# Patient Record
Sex: MALE | Race: OTHER | HISPANIC OR LATINO | URBAN - METROPOLITAN AREA
[De-identification: names, ages, dates, MRNs, and addresses within clinical notes are randomized per-mention and may not be internally consistent; named-entity substitution may affect disease eponyms.]

---

## 2017-05-19 ENCOUNTER — EMERGENCY (EMERGENCY)
Facility: HOSPITAL | Age: 32
LOS: 1 days | Discharge: HOME | End: 2017-05-19
Attending: EMERGENCY MEDICINE
Payer: COMMERCIAL

## 2017-05-19 VITALS
SYSTOLIC BLOOD PRESSURE: 115 MMHG | DIASTOLIC BLOOD PRESSURE: 71 MMHG | HEART RATE: 76 BPM | TEMPERATURE: 98 F | OXYGEN SATURATION: 97 % | HEIGHT: 69 IN | RESPIRATION RATE: 18 BRPM | WEIGHT: 190.04 LBS

## 2017-05-19 DIAGNOSIS — M54.2 CERVICALGIA: ICD-10-CM

## 2017-05-19 DIAGNOSIS — S80.212A ABRASION, LEFT KNEE, INITIAL ENCOUNTER: ICD-10-CM

## 2017-05-19 DIAGNOSIS — M54.5 LOW BACK PAIN: ICD-10-CM

## 2017-05-19 DIAGNOSIS — S80.01XA CONTUSION OF RIGHT KNEE, INITIAL ENCOUNTER: ICD-10-CM
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
## 2017-05-19 DIAGNOSIS — R51 HEADACHE: ICD-10-CM

## 2017-05-19 DIAGNOSIS — G44.319 ACUTE POST-TRAUMATIC HEADACHE, NOT INTRACTABLE: ICD-10-CM

## 2017-05-19 DIAGNOSIS — M79.652 PAIN IN LEFT THIGH: ICD-10-CM
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
## 2017-05-19 DIAGNOSIS — S46.911A STRAIN OF UNSPECIFIED MUSCLE, FASCIA AND TENDON AT SHOULDER AND UPPER ARM LEVEL, RIGHT ARM, INITIAL ENCOUNTER: ICD-10-CM

## 2017-05-19 DIAGNOSIS — M79.601 PAIN IN RIGHT ARM: ICD-10-CM

## 2017-05-19 DIAGNOSIS — M54.6 PAIN IN THORACIC SPINE: ICD-10-CM

## 2017-05-19 PROCEDURE — 73030 X-RAY EXAM OF SHOULDER: CPT

## 2017-05-19 PROCEDURE — 99284 EMERGENCY DEPT VISIT MOD MDM: CPT | Mod: 25

## 2017-05-19 PROCEDURE — 73130 X-RAY EXAM OF HAND: CPT | Mod: 26,RT

## 2017-05-19 PROCEDURE — 99284 EMERGENCY DEPT VISIT MOD MDM: CPT

## 2017-05-19 PROCEDURE — 73030 X-RAY EXAM OF SHOULDER: CPT | Mod: 26,RT

## 2017-05-19 PROCEDURE — 73130 X-RAY EXAM OF HAND: CPT

## 2017-05-19 RX ORDER — IBUPROFEN 200 MG
600 TABLET ORAL ONCE
Qty: 0 | Refills: 0 | Status: COMPLETED | OUTPATIENT
Start: 2017-05-19 | End: 2017-05-19

## 2017-05-19 RX ORDER — IBUPROFEN 200 MG
1 TABLET ORAL
Qty: 20 | Refills: 0
Start: 2017-05-19 | End: 2017-05-24

## 2017-05-19 RX ORDER — METHOCARBAMOL 500 MG/1
1000 TABLET, FILM COATED ORAL ONCE
Qty: 0 | Refills: 0 | Status: COMPLETED | OUTPATIENT
Start: 2017-05-19 | End: 2017-05-19

## 2017-05-19 RX ORDER — METHOCARBAMOL 500 MG/1
2 TABLET, FILM COATED ORAL
Qty: 18 | Refills: 0
Start: 2017-05-19 | End: 2017-05-23

## 2017-05-19 RX ADMIN — METHOCARBAMOL 1000 MILLIGRAM(S): 500 TABLET, FILM COATED ORAL at 14:09

## 2017-05-19 RX ADMIN — Medication 600 MILLIGRAM(S): at 14:09

## 2017-05-19 NOTE — ED STATDOCS - MUSCULOSKELETAL FINDINGS, MLM
Diffused midline, C-spine tenderness, left trapezius tenderness. Left para thorasic muscle tenderness and paralumbar tenderness. LLE no localized bony tenderness and FROM. Tenderness over 1st right menicarpal pharngeal joint. No scaphoid tenderness. Tendernesss to right schapula, FROM in elbows and shoulders. Diffuse midline C-spine tenderness, left trapezius tenderness. Left para thorasic muscle tenderness and paralumbar tenderness. LLE no localized bony tenderness and FROM. Tenderness over 1st right menicarpal pharngeal joint. No scaphoid tenderness. Tendernesss to right scapula, FROM in elbows and shoulders.
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
## 2017-05-19 NOTE — ED STATDOCS - PROGRESS NOTE DETAILS
NP NOTE: Pt seen by intake physician and HPI/ROS/PE/MDM reviewed. Pt seen and evaluated. Discussed plan and any resulted studies at this time. Will continue to monitor and re-evaluate.  Re-Evaluation: pt feeling improved. will dc.
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
## 2017-05-19 NOTE — ED STATDOCS - NS ED MD SCRIBE ATTENDING SCRIBE SECTIONS
HISTORY OF PRESENT ILLNESS/PAST MEDICAL/SURGICAL/SOCIAL HISTORY/HIV/PROGRESS NOTE/VITAL SIGNS( Pullset)/PHYSICAL EXAM/CONSULTATIONS/SHIFT CHANGE/DISPOSITION/INTAKE ASSESSMENT/SCREENINGS/REVIEW OF SYSTEMS/RESULTS

## 2017-05-19 NOTE — ED STATDOCS - LEFT SKIN INTEGRITY
ecchymosis medial aspect of right knee. ecchymosis medial aspect of right knee.  abrasion over anterior aspect of left knee

## 2017-05-19 NOTE — ED STATDOCS - ATTENDING CONTRIBUTION TO CARE
I, Riccardo Rogers, performed the initial face to face bedside interview with this patient regarding history of present illness, review of symptoms and relevant past medical, social and family history.  I completed an independent physical examination.  I was the provider who initially evaluated this patient.  The history, relevant review of systems, past medical and surgical history, medical decision making, and physical examination was documented by the scribe in my presence and I attest to the accuracy of the documentation. Follow-up on ordered tests (ie labs, radiologic studies) and re-evaluation of the patient's status has been communicated to the ACP.  Disposition of the patient will be based on test outcome and response to ED interventions.

## 2018-02-09 ENCOUNTER — EMERGENCY (EMERGENCY)
Facility: HOSPITAL | Age: 33
LOS: 1 days | Discharge: HOME | End: 2018-02-09
Attending: STUDENT IN AN ORGANIZED HEALTH CARE EDUCATION/TRAINING PROGRAM
Payer: COMMERCIAL

## 2018-02-09 VITALS
HEART RATE: 103 BPM | DIASTOLIC BLOOD PRESSURE: 86 MMHG | TEMPERATURE: 99 F | WEIGHT: 190.04 LBS | HEIGHT: 71 IN | SYSTOLIC BLOOD PRESSURE: 142 MMHG | OXYGEN SATURATION: 100 % | RESPIRATION RATE: 18 BRPM

## 2018-02-09 PROCEDURE — 90715 TDAP VACCINE 7 YRS/> IM: CPT

## 2018-02-09 PROCEDURE — 99283 EMERGENCY DEPT VISIT LOW MDM: CPT | Mod: 25

## 2018-02-09 PROCEDURE — 12001 RPR S/N/AX/GEN/TRNK 2.5CM/<: CPT

## 2018-02-09 PROCEDURE — 90471 IMMUNIZATION ADMIN: CPT

## 2018-02-09 RX ORDER — TETANUS TOXOID, REDUCED DIPHTHERIA TOXOID AND ACELLULAR PERTUSSIS VACCINE, ADSORBED 5; 2.5; 8; 8; 2.5 [IU]/.5ML; [IU]/.5ML; UG/.5ML; UG/.5ML; UG/.5ML
0.5 SUSPENSION INTRAMUSCULAR ONCE
Qty: 0 | Refills: 0 | Status: COMPLETED | OUTPATIENT
Start: 2018-02-09 | End: 2018-02-09

## 2018-02-09 RX ORDER — CEPHALEXIN 500 MG
1 CAPSULE ORAL
Qty: 10 | Refills: 0
Start: 2018-02-09 | End: 2018-02-13

## 2018-02-09 RX ADMIN — TETANUS TOXOID, REDUCED DIPHTHERIA TOXOID AND ACELLULAR PERTUSSIS VACCINE, ADSORBED 0.5 MILLILITER(S): 5; 2.5; 8; 8; 2.5 SUSPENSION INTRAMUSCULAR at 19:36

## 2018-02-09 NOTE — ED PROVIDER NOTE - ATTENDING CONTRIBUTION TO CARE
31 yo male with hand laceration. I personally saw the patient with the PA, and completed the key components of the history and physical exam. I then discussed the management plan with the PA.

## 2018-02-09 NOTE — ED PROVIDER NOTE - OBJECTIVE STATEMENT
31 yo M presented to ED with c/o laceration to right hand sustained while at work. Pt states that he was working as a  and sustained laceration on sharp piece of metal. No weakness or paresthesia. dT unknown. Bleeding controlled.

## 2018-02-09 NOTE — ED PROVIDER NOTE - SKIN WOUND TYPE
LACERATION(S)/+ 2.5 cm superficial laceration to lateral aspect mid 5th metacarpal area. Superficial lac. No muscle, tendon, ligament, or joinmt invol. FROM. Sensation intact

## 2019-04-18 ENCOUNTER — EMERGENCY (EMERGENCY)
Facility: HOSPITAL | Age: 34
LOS: 1 days | End: 2019-04-18
Attending: EMERGENCY MEDICINE
Payer: SELF-PAY

## 2019-04-18 VITALS
OXYGEN SATURATION: 98 % | DIASTOLIC BLOOD PRESSURE: 88 MMHG | WEIGHT: 205.03 LBS | SYSTOLIC BLOOD PRESSURE: 135 MMHG | HEART RATE: 89 BPM | HEIGHT: 69 IN | RESPIRATION RATE: 18 BRPM | TEMPERATURE: 98 F

## 2019-04-18 PROCEDURE — 73030 X-RAY EXAM OF SHOULDER: CPT

## 2019-04-18 PROCEDURE — 73030 X-RAY EXAM OF SHOULDER: CPT | Mod: 26,RT

## 2019-04-18 PROCEDURE — 71046 X-RAY EXAM CHEST 2 VIEWS: CPT

## 2019-04-18 PROCEDURE — 99283 EMERGENCY DEPT VISIT LOW MDM: CPT

## 2019-04-18 PROCEDURE — 71046 X-RAY EXAM CHEST 2 VIEWS: CPT | Mod: 26

## 2019-04-18 PROCEDURE — 99284 EMERGENCY DEPT VISIT MOD MDM: CPT | Mod: 25

## 2019-04-18 RX ORDER — METHOCARBAMOL 500 MG/1
2 TABLET, FILM COATED ORAL
Qty: 12 | Refills: 0
Start: 2019-04-18 | End: 2019-04-19

## 2019-04-18 RX ORDER — METHOCARBAMOL 500 MG/1
750 TABLET, FILM COATED ORAL ONCE
Qty: 0 | Refills: 0 | Status: COMPLETED | OUTPATIENT
Start: 2019-04-18 | End: 2019-04-18

## 2019-04-18 RX ORDER — IBUPROFEN 200 MG
600 TABLET ORAL ONCE
Qty: 0 | Refills: 0 | Status: COMPLETED | OUTPATIENT
Start: 2019-04-18 | End: 2019-04-18

## 2019-04-18 RX ORDER — IBUPROFEN 200 MG
1 TABLET ORAL
Qty: 9 | Refills: 0
Start: 2019-04-18 | End: 2019-04-20

## 2019-04-18 RX ADMIN — Medication 600 MILLIGRAM(S): at 21:37

## 2019-04-18 RX ADMIN — METHOCARBAMOL 750 MILLIGRAM(S): 500 TABLET, FILM COATED ORAL at 21:53

## 2019-04-18 NOTE — ED PROVIDER NOTE - CLINICAL SUMMARY MEDICAL DECISION MAKING FREE TEXT BOX
34 y/o M with pain in right shoulder s/p injury today, ttp of trapezius muscle, no scapula ttp  -Will check XR of shoulder and chest, motrin/robaxin  -Will follow up and re-evaluate patient

## 2019-04-18 NOTE — ED ADULT TRIAGE NOTE - CHIEF COMPLAINT QUOTE
working on car on a lift hit me in my right shoulder pain no gross deformity limited rom good distal pulses
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
## 2019-04-18 NOTE — ED PROVIDER NOTE - CHPI ED SYMPTOMS NEG
no tingling/no abrasion/no back pain/no difficulty bearing weight/no bruising/no fever/no numbness/no weakness/no deformity/no stiffness

## 2019-04-18 NOTE — ED PROVIDER NOTE - PROGRESS NOTE DETAILS
PASCUAL Mustafa NOTE: Reviewed XRs with Dr. Capellan, no acute fx or dislocation PASCUAL Mustafa NOTE: Pt has ride home, instructed not to drive while on muscle relaxers. Patient stable for discharge, reports improvement in pain s/p medications, vital signs stable, tolerating PO, ambulatory in ED.  Discussion with pt includes but is not limited to: results, plan, proper medication use and side effects, and return precautions. Patient will follow up with their PMD in 1-2 days and any specialists discussed. Advised patient to seek immediate medical attention for any new/worsening symptoms or concerns.  Patient provided with ample opportunity to ask questions which were answered to the fullest extent. Patient verbalized understanding and agreement of plan.

## 2019-04-18 NOTE — ED PROVIDER NOTE - ATTENDING CONTRIBUTION TO CARE
Pt. awake and alert. NO acute distress. +Tenderness to right upper back/trapezius region.. No cervical tenderness. No weakness noted to his upper or lower extremities. I have discussed the plan with the ACP.

## 2019-12-29 ENCOUNTER — EMERGENCY (EMERGENCY)
Facility: HOSPITAL | Age: 34
LOS: 1 days | Discharge: HOME | End: 2019-12-29
Attending: EMERGENCY MEDICINE
Payer: SELF-PAY

## 2019-12-29 VITALS
RESPIRATION RATE: 20 BRPM | OXYGEN SATURATION: 97 % | SYSTOLIC BLOOD PRESSURE: 146 MMHG | HEIGHT: 69 IN | WEIGHT: 205.03 LBS | TEMPERATURE: 97 F | HEART RATE: 75 BPM | DIASTOLIC BLOOD PRESSURE: 96 MMHG

## 2019-12-29 PROBLEM — J45.909 UNSPECIFIED ASTHMA, UNCOMPLICATED: Chronic | Status: ACTIVE | Noted: 2019-04-18

## 2019-12-29 LAB
ALBUMIN SERPL ELPH-MCNC: 4.5 G/DL — SIGNIFICANT CHANGE UP (ref 3.3–5.2)
ALP SERPL-CCNC: 103 U/L — SIGNIFICANT CHANGE UP (ref 40–120)
ALT FLD-CCNC: 70 U/L — HIGH
ANION GAP SERPL CALC-SCNC: 14 MMOL/L — SIGNIFICANT CHANGE UP (ref 5–17)
APTT BLD: 31.6 SEC — SIGNIFICANT CHANGE UP (ref 27.5–36.3)
AST SERPL-CCNC: 67 U/L — HIGH
BASOPHILS # BLD AUTO: 0.05 K/UL — SIGNIFICANT CHANGE UP (ref 0–0.2)
BASOPHILS NFR BLD AUTO: 0.9 % — SIGNIFICANT CHANGE UP (ref 0–2)
BILIRUB SERPL-MCNC: 0.2 MG/DL — LOW (ref 0.4–2)
BUN SERPL-MCNC: 16 MG/DL — SIGNIFICANT CHANGE UP (ref 8–20)
CALCIUM SERPL-MCNC: 9 MG/DL — SIGNIFICANT CHANGE UP (ref 8.6–10.2)
CHLORIDE SERPL-SCNC: 101 MMOL/L — SIGNIFICANT CHANGE UP (ref 98–107)
CO2 SERPL-SCNC: 21 MMOL/L — LOW (ref 22–29)
CREAT SERPL-MCNC: 0.95 MG/DL — SIGNIFICANT CHANGE UP (ref 0.5–1.3)
EOSINOPHIL # BLD AUTO: 0.47 K/UL — SIGNIFICANT CHANGE UP (ref 0–0.5)
EOSINOPHIL NFR BLD AUTO: 8 % — HIGH (ref 0–6)
GLUCOSE SERPL-MCNC: 98 MG/DL — SIGNIFICANT CHANGE UP (ref 70–115)
HCT VFR BLD CALC: 40.8 % — SIGNIFICANT CHANGE UP (ref 39–50)
HGB BLD-MCNC: 13.8 G/DL — SIGNIFICANT CHANGE UP (ref 13–17)
IMM GRANULOCYTES NFR BLD AUTO: 0.3 % — SIGNIFICANT CHANGE UP (ref 0–1.5)
INR BLD: 0.83 RATIO — LOW (ref 0.88–1.16)
LYMPHOCYTES # BLD AUTO: 1.67 K/UL — SIGNIFICANT CHANGE UP (ref 1–3.3)
LYMPHOCYTES # BLD AUTO: 28.5 % — SIGNIFICANT CHANGE UP (ref 13–44)
MCHC RBC-ENTMCNC: 31.4 PG — SIGNIFICANT CHANGE UP (ref 27–34)
MCHC RBC-ENTMCNC: 33.8 GM/DL — SIGNIFICANT CHANGE UP (ref 32–36)
MCV RBC AUTO: 92.9 FL — SIGNIFICANT CHANGE UP (ref 80–100)
MONOCYTES # BLD AUTO: 0.54 K/UL — SIGNIFICANT CHANGE UP (ref 0–0.9)
MONOCYTES NFR BLD AUTO: 9.2 % — SIGNIFICANT CHANGE UP (ref 2–14)
NEUTROPHILS # BLD AUTO: 3.1 K/UL — SIGNIFICANT CHANGE UP (ref 1.8–7.4)
NEUTROPHILS NFR BLD AUTO: 53.1 % — SIGNIFICANT CHANGE UP (ref 43–77)
PLATELET # BLD AUTO: 230 K/UL — SIGNIFICANT CHANGE UP (ref 150–400)
POTASSIUM SERPL-MCNC: 4.3 MMOL/L — SIGNIFICANT CHANGE UP (ref 3.5–5.3)
POTASSIUM SERPL-SCNC: 4.3 MMOL/L — SIGNIFICANT CHANGE UP (ref 3.5–5.3)
PROT SERPL-MCNC: 7.4 G/DL — SIGNIFICANT CHANGE UP (ref 6.6–8.7)
PROTHROM AB SERPL-ACNC: 9.5 SEC — LOW (ref 10–12.9)
RBC # BLD: 4.39 M/UL — SIGNIFICANT CHANGE UP (ref 4.2–5.8)
RBC # FLD: 13.2 % — SIGNIFICANT CHANGE UP (ref 10.3–14.5)
SODIUM SERPL-SCNC: 136 MMOL/L — SIGNIFICANT CHANGE UP (ref 135–145)
TSH SERPL-MCNC: 2.12 UIU/ML — SIGNIFICANT CHANGE UP (ref 0.27–4.2)
WBC # BLD: 5.85 K/UL — SIGNIFICANT CHANGE UP (ref 3.8–10.5)
WBC # FLD AUTO: 5.85 K/UL — SIGNIFICANT CHANGE UP (ref 3.8–10.5)

## 2019-12-29 PROCEDURE — 84443 ASSAY THYROID STIM HORMONE: CPT

## 2019-12-29 PROCEDURE — 93970 EXTREMITY STUDY: CPT

## 2019-12-29 PROCEDURE — 85610 PROTHROMBIN TIME: CPT

## 2019-12-29 PROCEDURE — 36415 COLL VENOUS BLD VENIPUNCTURE: CPT

## 2019-12-29 PROCEDURE — 80053 COMPREHEN METABOLIC PANEL: CPT

## 2019-12-29 PROCEDURE — 85730 THROMBOPLASTIN TIME PARTIAL: CPT

## 2019-12-29 PROCEDURE — 71046 X-RAY EXAM CHEST 2 VIEWS: CPT

## 2019-12-29 PROCEDURE — 71046 X-RAY EXAM CHEST 2 VIEWS: CPT | Mod: 26

## 2019-12-29 PROCEDURE — 93970 EXTREMITY STUDY: CPT | Mod: 26

## 2019-12-29 PROCEDURE — 99284 EMERGENCY DEPT VISIT MOD MDM: CPT

## 2019-12-29 PROCEDURE — 99284 EMERGENCY DEPT VISIT MOD MDM: CPT | Mod: 25

## 2019-12-29 PROCEDURE — 85027 COMPLETE CBC AUTOMATED: CPT

## 2019-12-29 NOTE — ED STATDOCS - PROVIDER TOKENS
FREE:[LAST:[mike],FIRST:[Clinic],PHONE:[phone]],FAX:[phone]   [phone]    -],ADDRESS:[[facility] [place] [address]]]

## 2019-12-29 NOTE — ED STATDOCS - CARE PROVIDER_API CALL
[facility]  [address] [place] [address]  Phone: [phone]  Fax: (   )    -  Follow Up Time:

## 2019-12-29 NOTE — ED STATDOCS - OBJECTIVE STATEMENT
34yoM with asthma c/o b/l feet and ankle swelling since yesterday; Pt denies any PMH or medications.  Pt states he feels a bit short of breath and has some orthopnea ( pt states it feels different from his asthma) x 2 -3 days; denies abdominal distension; Reports normal UOP.  pt reports a URI a few days ago.  No recent travel.  NO sig cardiac FH. Pt repots some significant alcohol use though he denies daily use.  Denies known liver problems.

## 2019-12-29 NOTE — ED STATDOCS - PROGRESS NOTE DETAILS
PT evaluated by intake physician. HPI/PE/ROS as noted above. Will follow up plan per intake physician. results reviewed with pt. Pt to follow up with [facility]

## 2019-12-29 NOTE — ED STATDOCS - PATIENT PORTAL LINK FT
You can access the FollowMyHealth Patient Portal offered by [facility] by registering at the following website: [email or web address]. By joining [facility]’s FollowMyHealth portal, you will also be able to view your health information using other applications (apps) compatible with our system.

## 2019-12-29 NOTE — ED STATDOCS - CLINICAL SUMMARY MEDICAL DECISION MAKING FREE TEXT BOX
pt presents with b/l LE edema and mild SOB; h/o asthma; lungs CTa b/l. Will check screening blood work/ CXR/ b/l LE US / reevaluate.

## 2019-12-29 NOTE — ED STATDOCS - NSFOLLOWUPINSTRUCTIONS_ED_ALL_ED_FT
1. TAKE ALL MEDICATIONS AS DIRECTED.  FOR PAIN YOU CAN TAKE IBUPROFEN (MOTRIN, ADVIL) OR ACETAMINOPHEN (TYLENOL) AS NEEDED, AS DIRECTED ON PACKAGING.  2. FOLLOW UP WITH _____[facility]_____ AS DIRECTED.  YOU WERE GIVEN COPIES OF ALL LABS AND IMAGING RESULTS FROM YOUR ER VISIT--PLEASE TAKE THEM WITH YOU TO YOUR APPOINTMENT.  3. IF NEEDED, CALL [phone] TO FIND A PRIMARY CARE PHYSICIAN.  OR CALL [phone] TO MAKE AN APPOINTMENT WITH THE MEDICAL CLINIC.  4. RETURN TO THE ER FOR ANY WORSENING SYMPTOMS.

## 2019-12-29 NOTE — ED ADULT TRIAGE NOTE - BP NONINVASIVE SYSTOLIC (MM HG)
Interval History: patient was accepted for liver transplant; planning for listing on Monday  - afebrile last night off abx; sodium has dropped; stopping PPN and lasix; monitor    Review of Systems   Constitutional: Negative for chills, fatigue and fever.   HENT: Negative for sore throat and trouble swallowing.    Eyes: Negative for photophobia and visual disturbance.   Respiratory: Negative for cough and shortness of breath.    Cardiovascular: Negative for chest pain, palpitations and leg swelling.   Gastrointestinal: Positive for abdominal distention. Negative for abdominal pain, constipation, diarrhea, nausea and vomiting.   Endocrine: Negative for cold intolerance and heat intolerance.   Genitourinary: Negative for dysuria and frequency.   Musculoskeletal: Negative for arthralgias and myalgias.   Skin: Positive for color change. Negative for rash and wound.   Neurological: Negative for dizziness, syncope, weakness and light-headedness.   Psychiatric/Behavioral: Negative for confusion and hallucinations.   All other systems reviewed and are negative.    Objective:     Vital Signs (Most Recent):  Temp: 97.5 °F (36.4 °C) (10/16/17 1641)  Pulse: 100 (10/16/17 1641)  Resp: 16 (10/16/17 1641)  BP: (!) 120/59 (10/16/17 1641)  SpO2: 96 % (10/16/17 1641) Vital Signs (24h Range):  Temp:  [97.5 °F (36.4 °C)-99.4 °F (37.4 °C)] 97.5 °F (36.4 °C)  Pulse:  [] 100  Resp:  [14-20] 16  SpO2:  [94 %-97 %] 96 %  BP: (107-120)/(54-59) 120/59     Weight: 93.4 kg (205 lb 12.8 oz)  Body mass index is 32.23 kg/m².    Intake/Output Summary (Last 24 hours) at 10/16/17 1847  Last data filed at 10/16/17 1802   Gross per 24 hour   Intake             1360 ml   Output              480 ml   Net              880 ml      Physical Exam   Constitutional: He appears well-developed and well-nourished. He is cooperative.  Non-toxic appearance. No distress.   HENT:   Head: Normocephalic and atraumatic.   Mouth/Throat: Oropharynx is clear and  moist.   Eyes: EOM are normal. Pupils are equal, round, and reactive to light. Scleral icterus is present.   Neck: Normal range of motion. Neck supple.   Cardiovascular: Regular rhythm, normal heart sounds and normal pulses.  Tachycardia present.    No murmur heard.  Sinus tachycardia   Pulmonary/Chest: Effort normal. No respiratory distress. He has decreased breath sounds in the right lower field and the left lower field. He has no wheezes. He has no rales.   Abdominal: Soft. Normal appearance. He exhibits distension. He exhibits no mass. Bowel sounds are decreased. There is no tenderness. There is no rebound and no guarding. No hernia.   Musculoskeletal: Normal range of motion. He exhibits no edema.   Lymphadenopathy:     He has no cervical adenopathy.   Neurological: He is alert. He is not disoriented.   Oriented to person and place today   Skin: Skin is warm and dry. He is not diaphoretic.   Old blister/ wart on right plantar surface of the foot near the big toe   Psychiatric: He has a normal mood and affect. His behavior is normal. His affect is not labile. His speech is delayed. Cognition and memory are not impaired.   Nursing note and vitals reviewed.      Significant Labs:   CBC:     Recent Labs  Lab 10/15/17  0400 10/16/17  0528   WBC 16.02* 15.92*   HGB 7.3* 6.9*   HCT 20.2* 18.6*   * 133*     CMP:     Recent Labs  Lab 10/15/17  0400 10/15/17  1929 10/16/17  0528   * 123* 122*   K 3.8 3.8 3.5   CL 94* 95 93*   CO2 14* 13* 14*   * 110 103   BUN 76* 80* 84*   CREATININE 2.2* 2.0* 2.4*   CALCIUM 8.5* 9.1 8.6*   PROT 6.0  --  6.1   ALBUMIN 3.1*  --  3.3*   BILITOT 29.0*  --  31.3*   ALKPHOS 107  --  107   AST 94*  --  162*   ALT 55*  --  65*   ANIONGAP 14 15 15   EGFRNONAA 39.3* 44.1* 35.4*     Coagulation:     Recent Labs  Lab 10/16/17  0528   INR 1.6*     All pertinent labs within the past 24 hours have been reviewed.    Significant Imaging: CXR: I have reviewed all pertinent  results/findings within the past 24 hours and my personal findings are:  NG in appropriate position   146

## 2020-06-04 ENCOUNTER — EMERGENCY (EMERGENCY)
Facility: HOSPITAL | Age: 35
LOS: 1 days | Discharge: HOME | End: 2020-06-04
Attending: EMERGENCY MEDICINE
Payer: MEDICAID

## 2020-06-04 VITALS
DIASTOLIC BLOOD PRESSURE: 85 MMHG | OXYGEN SATURATION: 98 % | HEART RATE: 120 BPM | TEMPERATURE: 99 F | RESPIRATION RATE: 20 BRPM | SYSTOLIC BLOOD PRESSURE: 162 MMHG

## 2020-06-04 LAB
ALBUMIN SERPL ELPH-MCNC: 5.1 G/DL — SIGNIFICANT CHANGE UP (ref 3.3–5.2)
ALP SERPL-CCNC: 91 U/L — SIGNIFICANT CHANGE UP (ref 40–120)
ALT FLD-CCNC: 33 U/L — SIGNIFICANT CHANGE UP
AMPHET UR-MCNC: NEGATIVE — SIGNIFICANT CHANGE UP
ANION GAP SERPL CALC-SCNC: 19 MMOL/L — HIGH (ref 5–17)
APTT BLD: 28.8 SEC — SIGNIFICANT CHANGE UP (ref 27.5–36.3)
AST SERPL-CCNC: 37 U/L — SIGNIFICANT CHANGE UP
BARBITURATES UR SCN-MCNC: NEGATIVE — SIGNIFICANT CHANGE UP
BASOPHILS # BLD AUTO: 0.1 K/UL — SIGNIFICANT CHANGE UP (ref 0–0.2)
BASOPHILS NFR BLD AUTO: 1 % — SIGNIFICANT CHANGE UP (ref 0–2)
BENZODIAZ UR-MCNC: NEGATIVE — SIGNIFICANT CHANGE UP
BILIRUB SERPL-MCNC: 0.7 MG/DL — SIGNIFICANT CHANGE UP (ref 0.4–2)
BLD GP AB SCN SERPL QL: SIGNIFICANT CHANGE UP
BUN SERPL-MCNC: 15 MG/DL — SIGNIFICANT CHANGE UP (ref 8–20)
CALCIUM SERPL-MCNC: 9.5 MG/DL — SIGNIFICANT CHANGE UP (ref 8.6–10.2)
CHLORIDE SERPL-SCNC: 99 MMOL/L — SIGNIFICANT CHANGE UP (ref 98–107)
CO2 SERPL-SCNC: 21 MMOL/L — LOW (ref 22–29)
COCAINE METAB.OTHER UR-MCNC: POSITIVE
CREAT SERPL-MCNC: 1.56 MG/DL — HIGH (ref 0.5–1.3)
EOSINOPHIL # BLD AUTO: 0.03 K/UL — SIGNIFICANT CHANGE UP (ref 0–0.5)
EOSINOPHIL NFR BLD AUTO: 0.3 % — SIGNIFICANT CHANGE UP (ref 0–6)
ETHANOL SERPL-MCNC: 246 MG/DL — SIGNIFICANT CHANGE UP
GLUCOSE SERPL-MCNC: 104 MG/DL — HIGH (ref 70–99)
HCT VFR BLD CALC: 45.4 % — SIGNIFICANT CHANGE UP (ref 39–50)
HGB BLD-MCNC: 15.8 G/DL — SIGNIFICANT CHANGE UP (ref 13–17)
IMM GRANULOCYTES NFR BLD AUTO: 0.2 % — SIGNIFICANT CHANGE UP (ref 0–1.5)
INR BLD: 1 RATIO — SIGNIFICANT CHANGE UP (ref 0.88–1.16)
LYMPHOCYTES # BLD AUTO: 2.73 K/UL — SIGNIFICANT CHANGE UP (ref 1–3.3)
LYMPHOCYTES # BLD AUTO: 28.3 % — SIGNIFICANT CHANGE UP (ref 13–44)
MCHC RBC-ENTMCNC: 31.7 PG — SIGNIFICANT CHANGE UP (ref 27–34)
MCHC RBC-ENTMCNC: 34.8 GM/DL — SIGNIFICANT CHANGE UP (ref 32–36)
MCV RBC AUTO: 91.2 FL — SIGNIFICANT CHANGE UP (ref 80–100)
METHADONE UR-MCNC: NEGATIVE — SIGNIFICANT CHANGE UP
MONOCYTES # BLD AUTO: 0.82 K/UL — SIGNIFICANT CHANGE UP (ref 0–0.9)
MONOCYTES NFR BLD AUTO: 8.5 % — SIGNIFICANT CHANGE UP (ref 2–14)
NEUTROPHILS # BLD AUTO: 5.96 K/UL — SIGNIFICANT CHANGE UP (ref 1.8–7.4)
NEUTROPHILS NFR BLD AUTO: 61.7 % — SIGNIFICANT CHANGE UP (ref 43–77)
OPIATES UR-MCNC: NEGATIVE — SIGNIFICANT CHANGE UP
PCP SPEC-MCNC: SIGNIFICANT CHANGE UP
PCP UR-MCNC: NEGATIVE — SIGNIFICANT CHANGE UP
PLATELET # BLD AUTO: 365 K/UL — SIGNIFICANT CHANGE UP (ref 150–400)
POTASSIUM SERPL-MCNC: 3.6 MMOL/L — SIGNIFICANT CHANGE UP (ref 3.5–5.3)
POTASSIUM SERPL-SCNC: 3.6 MMOL/L — SIGNIFICANT CHANGE UP (ref 3.5–5.3)
PROT SERPL-MCNC: 8.4 G/DL — SIGNIFICANT CHANGE UP (ref 6.6–8.7)
PROTHROM AB SERPL-ACNC: 11.3 SEC — SIGNIFICANT CHANGE UP (ref 10–12.9)
RBC # BLD: 4.98 M/UL — SIGNIFICANT CHANGE UP (ref 4.2–5.8)
RBC # FLD: 13 % — SIGNIFICANT CHANGE UP (ref 10.3–14.5)
SODIUM SERPL-SCNC: 139 MMOL/L — SIGNIFICANT CHANGE UP (ref 135–145)
THC UR QL: POSITIVE
WBC # BLD: 9.66 K/UL — SIGNIFICANT CHANGE UP (ref 3.8–10.5)
WBC # FLD AUTO: 9.66 K/UL — SIGNIFICANT CHANGE UP (ref 3.8–10.5)

## 2020-06-04 PROCEDURE — 99213 OFFICE O/P EST LOW 20 MIN: CPT

## 2020-06-04 PROCEDURE — 71045 X-RAY EXAM CHEST 1 VIEW: CPT | Mod: 26

## 2020-06-04 PROCEDURE — 71260 CT THORAX DX C+: CPT | Mod: 26

## 2020-06-04 PROCEDURE — 99285 EMERGENCY DEPT VISIT HI MDM: CPT

## 2020-06-04 PROCEDURE — 93010 ELECTROCARDIOGRAM REPORT: CPT

## 2020-06-04 PROCEDURE — 99283 EMERGENCY DEPT VISIT LOW MDM: CPT

## 2020-06-04 RX ORDER — CEFAZOLIN SODIUM 1 G
2000 VIAL (EA) INJECTION ONCE
Refills: 0 | Status: COMPLETED | OUTPATIENT
Start: 2020-06-04 | End: 2020-06-04

## 2020-06-04 RX ORDER — SODIUM CHLORIDE 9 MG/ML
1000 INJECTION, SOLUTION INTRAVENOUS ONCE
Refills: 0 | Status: COMPLETED | OUTPATIENT
Start: 2020-06-04 | End: 2020-06-04

## 2020-06-04 RX ORDER — ONDANSETRON 8 MG/1
4 TABLET, FILM COATED ORAL ONCE
Refills: 0 | Status: COMPLETED | OUTPATIENT
Start: 2020-06-04 | End: 2020-06-04

## 2020-06-04 RX ORDER — TETANUS TOXOID, REDUCED DIPHTHERIA TOXOID AND ACELLULAR PERTUSSIS VACCINE, ADSORBED 5; 2.5; 8; 8; 2.5 [IU]/.5ML; [IU]/.5ML; UG/.5ML; UG/.5ML; UG/.5ML
0.5 SUSPENSION INTRAMUSCULAR ONCE
Refills: 0 | Status: COMPLETED | OUTPATIENT
Start: 2020-06-04 | End: 2020-06-04

## 2020-06-04 RX ADMIN — SODIUM CHLORIDE 1000 MILLILITER(S): 9 INJECTION, SOLUTION INTRAVENOUS at 23:59

## 2020-06-04 RX ADMIN — Medication 2000 MILLIGRAM(S): at 22:47

## 2020-06-04 RX ADMIN — TETANUS TOXOID, REDUCED DIPHTHERIA TOXOID AND ACELLULAR PERTUSSIS VACCINE, ADSORBED 0.5 MILLILITER(S): 5; 2.5; 8; 8; 2.5 SUSPENSION INTRAMUSCULAR at 21:39

## 2020-06-04 RX ADMIN — SODIUM CHLORIDE 1000 MILLILITER(S): 9 INJECTION, SOLUTION INTRAVENOUS at 22:47

## 2020-06-04 RX ADMIN — SODIUM CHLORIDE 1000 MILLILITER(S): 9 INJECTION, SOLUTION INTRAVENOUS at 22:46

## 2020-06-04 RX ADMIN — ONDANSETRON 4 MILLIGRAM(S): 8 TABLET, FILM COATED ORAL at 22:46

## 2020-06-04 RX ADMIN — Medication 100 MILLIGRAM(S): at 21:42
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
## 2020-06-04 NOTE — H&P ADULT - GLASGOW COMA SCALE: EYE OPENING, MLM
Hi team, can we put the following of letterhead for Nevaeh - To Whom It May Concern:Nevaeh Barton is a 67 year old woman with recent diagnosis of breast cancer, currently under my care for adjuvant chemotherapy. As of the time of this letter (3/20/19), she is now 2 weeks postoperative following left breast lumpectomy, and she is cleared to return to work. I do anticipate that she will require time off therapy intermittently with her adjuvant chemotherapy treatment, and have recommended that she remain off work during her first cycle of treatment between the dates of 3/28/19 and 4/11/19. Please do not hesitate contact me with any further questions or concerns.Sincerely,Robert Hays, [facility] [id number] [place] [id number]: [phone] (E4) spontaneous
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
## 2020-06-04 NOTE — ED PROVIDER NOTE - MDM ORDERS SUBMITTED SELECTION
Pt informed he needs to provide urine specimen  Pt aware   Pt responded "I can't go yet"     Shaggy Warner RN  04/12/18 7064 Labs/EKG/Medications/Imaging Studies
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
## 2020-06-04 NOTE — H&P ADULT - NSHPPHYSICALEXAM_GEN_ALL_CORE
Constitutional: Well-developed well nourished Male in no acute distress, appears intoxicated  HEENT: Head is normocephalic and atraumatic, maxillofacial structures stable, no blood or discharge from nares or oral cavity, no carolina sign / racoon eyes, EOMI b/l, pupils 2 mm round and reactive to light b/l, no active drainage or redness  Neck: cervical collar in place, trachea midline  Respiratory: Breath sounds CTA b/l respirations are unlabored, no accessory muscle use, no conversational dyspnea. 1.5cm incision to the right of the mid-sternum  Cardiovascular: Regular rate & rhythm, +S1, S1, Chest wall is non-tender to palpation, no subQ emphysema or crepitus palpated  Gastrointestinal: Abdomen soft, non-tender, non-distended, no rebound tenderness / guarding, no ecchymosis or external signs of abdominal trauma  Musculoskeletal: moving all extremities spontaneously, 55 motor strength of b/l Upper and lower extremities. no point tenderness or deformity noted to upper or lower extremities b/l  Pelvis: stable  Vascular: 2+ radial, femoral, and DP pulses b/l  Neurological: GCS: 15 (4/5/6). A&O x 3; no gross sensory / motor / coordination deficits  Neurospinal: no C/T/LS spine tenderness to palpation, no step-offs or signs of external trauma to the back Constitutional: Well-developed well nourished Male in no acute distress, appears intoxicated  HEENT: Head is normocephalic and atraumatic, maxillofacial structures stable, no blood or discharge from nares or oral cavity, no carolina sign / racoon eyes, EOMI b/l, pupils 2 mm round and reactive to light b/l, no active drainage or redness  Neck: cervical collar in place, trachea midline  Respiratory: Breath sounds CTA b/l respirations are unlabored, no accessory muscle use, no conversational dyspnea. 1.5cm incision to the right of the mid-sternum no traumatopnea  Cardiovascular: Regular rate & rhythm, +S1, S1, Chest wall is non-tender to palpation, no subQ emphysema or crepitus palpated  Gastrointestinal: Abdomen soft, non-tender, non-distended, no rebound tenderness / guarding, no ecchymosis or external signs of abdominal trauma  Musculoskeletal: moving all extremities spontaneously, 55 motor strength of b/l Upper and lower extremities. no point tenderness or deformity noted to upper or lower extremities b/l  Pelvis: stable  Vascular: 2+ radial, femoral, and DP pulses b/l  Neurological: GCS: 15 (4/5/6). A&O x 3; no gross sensory / motor / coordination deficits  Neurospinal: no C/T/LS spine tenderness to palpation, no step-offs or signs of external trauma to the back
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
## 2020-06-04 NOTE — ED PROVIDER NOTE - ATTENDING CONTRIBUTION TO CARE
The patient seen and examined    Self inflicted wound  Depression    I, Danilo Mckeon, performed the initial face to face bedside interview with this patient regarding history of present illness, review of symptoms and relevant past medical, social and family history.  I completed an independent physical examination.  I was the initial provider who evaluated this patient. I have signed out the follow up of any pending tests (i.e. labs, radiological studies) to the resident.  I have communicated the patient’s plan of care and disposition with the resident.

## 2020-06-04 NOTE — ED PROVIDER NOTE - PROGRESS NOTE DETAILS
Pt reevaluated, and clinically sober. Medically cleared and will be sent back to  unit, telepsych contacted and will see pt. - Jerry Adams, PGY-1 Received call from telepsych. Pt to be voluntarily admitted, currently awaiting bed placement. Requesting aldo abel. - Jerry Adams, PGY-1 As per signout from [facility], patient awaiting inpatient psych placement but otherwise stable.

## 2020-06-05 DIAGNOSIS — F32.1 MAJOR DEPRESSIVE DISORDER, SINGLE EPISODE, MODERATE: ICD-10-CM

## 2020-06-05 PROCEDURE — 90792 PSYCH DIAG EVAL W/MED SRVCS: CPT | Mod: 95

## 2020-06-05 RX ORDER — NICOTINE POLACRILEX 2 MG
1 GUM BUCCAL DAILY
Refills: 0 | Status: DISCONTINUED | OUTPATIENT
Start: 2020-06-05 | End: 2020-06-09

## 2020-06-05 RX ORDER — QUETIAPINE FUMARATE 200 MG/1
50 TABLET, FILM COATED ORAL AT BEDTIME
Refills: 0 | Status: DISCONTINUED | OUTPATIENT
Start: 2020-06-05 | End: 2020-06-09

## 2020-06-05 RX ADMIN — Medication 1 PATCH: at 18:16

## 2020-06-05 RX ADMIN — Medication 2 MILLIGRAM(S): at 19:47

## 2020-06-05 RX ADMIN — SODIUM CHLORIDE 1000 MILLILITER(S): 9 INJECTION, SOLUTION INTRAVENOUS at 01:57
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
## 2020-06-05 NOTE — ED BEHAVIORAL HEALTH ASSESSMENT NOTE - SUICIDE RISK FACTORS
Alcohol/Substance abuse disorders Current mood episode/Hopelessness or despair/Alcohol/Substance abuse disorders

## 2020-06-05 NOTE — CHART NOTE - NSCHARTNOTEFT_GEN_A_CORE
SW Note: Met with pt to discuss psych recommendations for inpt psychiatric tx. pt pleasant and cooperative. Confirmed his name is Zaid Renu [birth year], address: [address] [place] [address]. States he had medicaid in the past but currently is uninsured and unemployed. Made pt aware of limited options for transfer due to no payer source. Aware we will be reaching out to [facility] first then [facility] if no beds. Covid results pending
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
## 2020-06-05 NOTE — CHART NOTE - NSCHARTNOTEFT_GEN_A_CORE
Patient seen and examined at bedside for tertiary exam. CT Chest was negative for intrathoracic injury. Patients R midline chest wound was repaired at bedside. Patient alert and oriented with no new wounds or injuries discovered on physical exam. Patient reports some superficial pain by sutures. Patient denies f/c/sob/n/v. Patient AVSS HD stable.    - no further Trauma surgical intervention indicated at this time  - patient is cleared for discharge per psych/ behavioral medicine and ED  - Trauma surgery will sign off

## 2020-06-06 LAB — SARS-COV-2 RNA SPEC QL NAA+PROBE: SIGNIFICANT CHANGE UP

## 2020-06-06 RX ADMIN — Medication 1 PATCH: at 14:47

## 2020-06-06 RX ADMIN — Medication 2 MILLIGRAM(S): at 12:06

## 2020-06-06 RX ADMIN — Medication 2 MILLIGRAM(S): at 21:53

## 2020-06-06 RX ADMIN — Medication 1 PATCH: at 07:47

## 2020-06-06 RX ADMIN — QUETIAPINE FUMARATE 50 MILLIGRAM(S): 200 TABLET, FILM COATED ORAL at 21:54
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
## 2020-06-06 NOTE — ED BEHAVIORAL HEALTH NOTE - BEHAVIORAL HEALTH NOTE
SWNote: pt needs transfer,  no beds available and pt uninsured . SW will continue bed search efforts in the am. SW to follow. CINDYNote: pt needs transfer, no beds available. Per registration staff pt is uninsured, per pt his Mcaid was reinstalled at [facility] last admission,worker unable to confirm this info as of yet . SW will continue bed search efforts in the am. SW to follow. CINDYNote: pt needs transfer, no beds available([facility]). Pt uninsured. SW will continue bed search efforts in the am. SW to follow.

## 2020-06-06 NOTE — CHART NOTE - NSCHARTNOTEFT_GEN_A_CORE
As per MD, patient is medically stable for discharge. As per Dr. Gonzalez, patient is to attend inpatient psychiatry facility once medically stable on 9.27 status. CINDY placed call to [facility] ([phone]) and spoke with Assistant Director of Nursing, Simone. AND [facility] reported there are no male beds available at this time. CINDY continues to follow for placement.

## 2020-06-07 RX ADMIN — Medication 1 PATCH: at 13:10

## 2020-06-07 RX ADMIN — QUETIAPINE FUMARATE 50 MILLIGRAM(S): 200 TABLET, FILM COATED ORAL at 22:14

## 2020-06-07 RX ADMIN — Medication 2 MILLIGRAM(S): at 15:38

## 2020-06-07 RX ADMIN — Medication 1 PATCH: at 18:20

## 2020-06-07 RX ADMIN — Medication 1 PATCH: at 13:09

## 2020-06-07 NOTE — ED BEHAVIORAL HEALTH NOTE - BEHAVIORAL HEALTH NOTE
Social work note: As per VERNA Nichols at [facility], there are no adult beds available. Pt is self pay/uninsured, and facilities are limited as who can accept. SW will continue to follow for safe psychiatric transfer.

## 2020-06-08 ENCOUNTER — INPATIENT (INPATIENT)
Facility: HOSPITAL | Age: 35
LOS: 15 days | Discharge: HOME | End: 2020-06-24
Attending: PSYCHIATRY & NEUROLOGY | Admitting: PSYCHIATRY & NEUROLOGY
Payer: MEDICAID

## 2020-06-08 VITALS
OXYGEN SATURATION: 98 % | DIASTOLIC BLOOD PRESSURE: 83 MMHG | SYSTOLIC BLOOD PRESSURE: 122 MMHG | TEMPERATURE: 99 F | RESPIRATION RATE: 18 BRPM | HEART RATE: 88 BPM

## 2020-06-08 VITALS — TEMPERATURE: 99 F | WEIGHT: 205.91 LBS | HEIGHT: 69 IN

## 2020-06-08 DIAGNOSIS — F32.9 MAJOR DEPRESSIVE DISORDER, SINGLE EPISODE, UNSPECIFIED: ICD-10-CM

## 2020-06-08 PROCEDURE — 71045 X-RAY EXAM CHEST 1 VIEW: CPT

## 2020-06-08 PROCEDURE — 36415 COLL VENOUS BLD VENIPUNCTURE: CPT

## 2020-06-08 PROCEDURE — 93005 ELECTROCARDIOGRAM TRACING: CPT

## 2020-06-08 PROCEDURE — 80307 DRUG TEST PRSMV CHEM ANLYZR: CPT

## 2020-06-08 PROCEDURE — 99211 OFF/OP EST MAY X REQ PHY/QHP: CPT

## 2020-06-08 PROCEDURE — 90471 IMMUNIZATION ADMIN: CPT

## 2020-06-08 PROCEDURE — 86900 BLOOD TYPING SEROLOGIC ABO: CPT

## 2020-06-08 PROCEDURE — 85730 THROMBOPLASTIN TIME PARTIAL: CPT

## 2020-06-08 PROCEDURE — 86901 BLOOD TYPING SEROLOGIC RH(D): CPT

## 2020-06-08 PROCEDURE — U0003: CPT

## 2020-06-08 PROCEDURE — 86850 RBC ANTIBODY SCREEN: CPT

## 2020-06-08 PROCEDURE — 85027 COMPLETE CBC AUTOMATED: CPT

## 2020-06-08 PROCEDURE — 99285 EMERGENCY DEPT VISIT HI MDM: CPT | Mod: 25

## 2020-06-08 PROCEDURE — 96361 HYDRATE IV INFUSION ADD-ON: CPT

## 2020-06-08 PROCEDURE — 96365 THER/PROPH/DIAG IV INF INIT: CPT | Mod: XU

## 2020-06-08 PROCEDURE — 96375 TX/PRO/DX INJ NEW DRUG ADDON: CPT | Mod: XU

## 2020-06-08 PROCEDURE — 99222 1ST HOSP IP/OBS MODERATE 55: CPT

## 2020-06-08 PROCEDURE — 85610 PROTHROMBIN TIME: CPT

## 2020-06-08 PROCEDURE — 90715 TDAP VACCINE 7 YRS/> IM: CPT

## 2020-06-08 PROCEDURE — 80053 COMPREHEN METABOLIC PANEL: CPT

## 2020-06-08 PROCEDURE — 71260 CT THORAX DX C+: CPT

## 2020-06-08 RX ORDER — QUETIAPINE FUMARATE 200 MG/1
50 TABLET, FILM COATED ORAL AT BEDTIME
Refills: 0 | Status: DISCONTINUED | OUTPATIENT
Start: 2020-06-08 | End: 2020-06-09

## 2020-06-08 RX ADMIN — Medication 1 PATCH: at 20:03

## 2020-06-08 RX ADMIN — Medication 2 MILLIGRAM(S): at 13:45

## 2020-06-08 RX ADMIN — QUETIAPINE FUMARATE 50 MILLIGRAM(S): 200 TABLET, FILM COATED ORAL at 23:30

## 2020-06-08 RX ADMIN — Medication 1 PATCH: at 13:44

## 2020-06-08 RX ADMIN — Medication 1 PATCH: at 07:45

## 2020-06-08 RX ADMIN — Medication 1 PATCH: at 13:45

## 2020-06-08 NOTE — CHART NOTE - NSCHARTNOTEFT_GEN_A_CORE
CINDY Note: Per handoff from day SW, plan is for inpt trx, pt is uninsured, limited options for placement. CINDY followed up with Alda at [facility] for possible bed tonight. Per Alda, they are unable to accommodate pt tonight, advised SW to call in AM for bed availability. CINDY placed call to [facility] for possible placement, no beds tonight, advised to call in AM for bed availability, CINDY continues to follow for trx

## 2020-06-08 NOTE — CHART NOTE - NSCHARTNOTEFT_GEN_A_CORE
SW Note: Plan is to transfer pt for inpt psychiatric care. pt is uninsured which limits options for placement. Referral made to [facility], spoke with Koffi [phone]. Bed census is still pending. Called [facility] [phone] and Alecia [phone], No male beds at this time. SW to follow

## 2020-06-08 NOTE — ED ADULT NURSE REASSESSMENT NOTE - COMFORT CARE
meal provided
ambulated to bathroom
ambulated to bathroom/meal provided/po fluids offered/plan of care explained
darkened lights
plan of care explained/warm blanket provided/darkened lights/po fluids offered
wait time explained/meal provided/darkened lights
ambulated to bathroom/wait time explained/meal provided/warm blanket provided
darkened lights
darkened lights
meal provided/plan of care explained
plan of care explained
plan of care explained/meal provided
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
## 2020-06-08 NOTE — ED ADULT NURSE REASSESSMENT NOTE - GENERAL PATIENT STATE
resting/sleeping
comfortable appearance
comfortable appearance/cooperative
resting/sleeping
resting/sleeping
comfortable appearance
comfortable appearance/cooperative
comfortable appearance/cooperative
comfortable appearance/cooperative/resting/sleeping
comfortable appearance/cooperative/resting/sleeping
comfortable appearance/resting/sleeping
resting/sleeping/comfortable appearance

## 2020-06-08 NOTE — ED ADULT NURSE REASSESSMENT NOTE - STATUS
awaiting consult
awaiting transfer, no change

## 2020-06-08 NOTE — CHART NOTE - NSCHARTNOTEFT_GEN_A_CORE
Screening Medical Evaluation  Patient Admitted from: [facility] ER    [facility] admitting diagnosis: Major depressive disorder with single episode    PAST MEDICAL & SURGICAL HISTORY:  Asthma  No significant past surgical history        Allergies    latex (Unknown)  No Known Drug Allergies    Intolerances        Social History:     FAMILY HISTORY:      MEDICATIONS  (STANDING):  QUEtiapine 50 milliGRAM(s) Oral at bedtime    MEDICATIONS  (PRN):  LORazepam     Tablet 2 milliGRAM(s) Oral every 6 hours PRN Agitation      Vital Signs Last 24 Hrs  T(C): 37.1 (08 Jun 2020 20:20), Max: 37.1 (08 Jun 2020 20:20)  T(F): 98.7 (08 Jun 2020 20:20), Max: 98.7 (08 Jun 2020 20:20)  HR: 88 (08 Jun 2020 20:20) (71 - 88)  BP: 122/83 (08 Jun 2020 20:20) (100/67 - 127/85)  BP(mean): --  RR: 18 (08 Jun 2020 20:20) (18 - 18)  SpO2: 98% (08 Jun 2020 20:20) (98% - 99%)  CAPILLARY BLOOD GLUCOSE            PHYSICAL EXAM:  GENERAL: NAD, well-developed  HEAD:  Atraumatic, Normocephalic  EYES: EOMI, PERRLA, conjunctiva and sclera clear  NECK: Supple, No JVD  CHEST/LUNG: Clear to auscultation bilaterally; No wheeze  HEART: Regular rate and rhythm; No murmurs, rubs, or gallops  ABDOMEN: Soft, Nontender, Nondistended; Bowel sounds present  EXTREMITIES:  2+ Peripheral Pulses, No clubbing, cyanosis, or edema  PSYCH: AAOx3  NEUROLOGY: non-focal  SKIN: In tact    LABS:                    RADIOLOGY & ADDITIONAL TESTS:    Assessment and Plan: 35 yo M with no significant PMH is admitted to [facility] with a primary psychiatric diagnosis of Major depressive disorder with single episode. The pt currently denies having any medical complaints such as chest pain, sob, abdominal pain, n/v/d/c, or any problems with urination or bowel movements. The rest of his screening physical is unremarkable.    1.Major depressive disorder with single episode-Plan: continue with meds as per primary psychiatric team

## 2020-06-09 LAB
CHOLEST SERPL-MCNC: 220 MG/DL — HIGH (ref 120–199)
HBA1C BLD-MCNC: 5.3 % — SIGNIFICANT CHANGE UP (ref 4–5.6)
HDLC SERPL-MCNC: 55 MG/DL — SIGNIFICANT CHANGE UP (ref 35–55)
LIPID PNL WITH DIRECT LDL SERPL: 132 MG/DL — SIGNIFICANT CHANGE UP
TRIGL SERPL-MCNC: 252 MG/DL — HIGH (ref 10–149)
TSH SERPL-MCNC: 4.91 UIU/ML — HIGH (ref 0.27–4.2)

## 2020-06-09 PROCEDURE — 99232 SBSQ HOSP IP/OBS MODERATE 35: CPT

## 2020-06-09 PROCEDURE — 99223 1ST HOSP IP/OBS HIGH 75: CPT

## 2020-06-09 RX ORDER — DIPHENHYDRAMINE HCL 50 MG
50 CAPSULE ORAL ONCE
Refills: 0 | Status: DISCONTINUED | OUTPATIENT
Start: 2020-06-09 | End: 2020-06-24

## 2020-06-09 RX ORDER — THIAMINE MONONITRATE (VIT B1) 100 MG
100 TABLET ORAL DAILY
Refills: 0 | Status: COMPLETED | OUTPATIENT
Start: 2020-06-09 | End: 2020-06-11

## 2020-06-09 RX ORDER — HALOPERIDOL DECANOATE 100 MG/ML
5 INJECTION INTRAMUSCULAR ONCE
Refills: 0 | Status: DISCONTINUED | OUTPATIENT
Start: 2020-06-09 | End: 2020-06-24

## 2020-06-09 RX ORDER — HALOPERIDOL DECANOATE 100 MG/ML
5 INJECTION INTRAMUSCULAR EVERY 6 HOURS
Refills: 0 | Status: DISCONTINUED | OUTPATIENT
Start: 2020-06-09 | End: 2020-06-24

## 2020-06-09 RX ORDER — NICOTINE POLACRILEX 2 MG
2 GUM BUCCAL
Refills: 0 | Status: DISCONTINUED | OUTPATIENT
Start: 2020-06-09 | End: 2020-06-24

## 2020-06-09 RX ORDER — ALBUTEROL 90 UG/1
2 AEROSOL, METERED ORAL EVERY 6 HOURS
Refills: 0 | Status: DISCONTINUED | OUTPATIENT
Start: 2020-06-09 | End: 2020-06-24

## 2020-06-09 RX ORDER — RISPERIDONE 4 MG/1
1 TABLET ORAL AT BEDTIME
Refills: 0 | Status: DISCONTINUED | OUTPATIENT
Start: 2020-06-09 | End: 2020-06-11

## 2020-06-09 RX ORDER — NICOTINE POLACRILEX 2 MG
1 GUM BUCCAL DAILY
Refills: 0 | Status: DISCONTINUED | OUTPATIENT
Start: 2020-06-09 | End: 2020-06-24

## 2020-06-09 RX ORDER — HYDROXYZINE HCL 10 MG
25 TABLET ORAL EVERY 6 HOURS
Refills: 0 | Status: DISCONTINUED | OUTPATIENT
Start: 2020-06-09 | End: 2020-06-24

## 2020-06-09 RX ORDER — FOLIC ACID 0.8 MG
1 TABLET ORAL DAILY
Refills: 0 | Status: COMPLETED | OUTPATIENT
Start: 2020-06-09 | End: 2020-06-15

## 2020-06-09 RX ORDER — DIPHENHYDRAMINE HCL 50 MG
50 CAPSULE ORAL EVERY 6 HOURS
Refills: 0 | Status: DISCONTINUED | OUTPATIENT
Start: 2020-06-09 | End: 2020-06-24

## 2020-06-09 RX ORDER — ESCITALOPRAM OXALATE 10 MG/1
5 TABLET, FILM COATED ORAL DAILY
Refills: 0 | Status: DISCONTINUED | OUTPATIENT
Start: 2020-06-09 | End: 2020-06-11

## 2020-06-09 RX ADMIN — Medication 100 MILLIGRAM(S): at 08:53

## 2020-06-09 RX ADMIN — Medication 2 MILLIGRAM(S): at 20:50

## 2020-06-09 RX ADMIN — Medication 50 MILLIGRAM(S): at 18:09

## 2020-06-09 RX ADMIN — RISPERIDONE 1 MILLIGRAM(S): 4 TABLET ORAL at 20:47

## 2020-06-09 RX ADMIN — ESCITALOPRAM OXALATE 5 MILLIGRAM(S): 10 TABLET, FILM COATED ORAL at 17:17

## 2020-06-09 RX ADMIN — HALOPERIDOL DECANOATE 5 MILLIGRAM(S): 100 INJECTION INTRAMUSCULAR at 18:09

## 2020-06-09 RX ADMIN — Medication 25 MILLIGRAM(S): at 18:09

## 2020-06-09 RX ADMIN — Medication 1 MILLIGRAM(S): at 08:53
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
## 2020-06-10 LAB
ALBUMIN SERPL ELPH-MCNC: 4.6 G/DL — SIGNIFICANT CHANGE UP (ref 3.3–5)
ALP SERPL-CCNC: 88 U/L — SIGNIFICANT CHANGE UP (ref 40–120)
ALT FLD-CCNC: 60 U/L — HIGH (ref 4–41)
ANION GAP SERPL CALC-SCNC: 10 MMO/L — SIGNIFICANT CHANGE UP (ref 7–14)
AST SERPL-CCNC: 53 U/L — HIGH (ref 4–40)
BILIRUB SERPL-MCNC: 0.3 MG/DL — SIGNIFICANT CHANGE UP (ref 0.2–1.2)
BUN SERPL-MCNC: 18 MG/DL — SIGNIFICANT CHANGE UP (ref 7–23)
CALCIUM SERPL-MCNC: 9.3 MG/DL — SIGNIFICANT CHANGE UP (ref 8.4–10.5)
CHLORIDE SERPL-SCNC: 105 MMOL/L — SIGNIFICANT CHANGE UP (ref 98–107)
CO2 SERPL-SCNC: 25 MMOL/L — SIGNIFICANT CHANGE UP (ref 22–31)
CREAT SERPL-MCNC: 1.21 MG/DL — SIGNIFICANT CHANGE UP (ref 0.5–1.3)
GLUCOSE SERPL-MCNC: 105 MG/DL — HIGH (ref 70–99)
POTASSIUM SERPL-MCNC: 4.3 MMOL/L — SIGNIFICANT CHANGE UP (ref 3.5–5.3)
POTASSIUM SERPL-SCNC: 4.3 MMOL/L — SIGNIFICANT CHANGE UP (ref 3.5–5.3)
PROT SERPL-MCNC: 7.1 G/DL — SIGNIFICANT CHANGE UP (ref 6–8.3)
SODIUM SERPL-SCNC: 140 MMOL/L — SIGNIFICANT CHANGE UP (ref 135–145)
TSH SERPL-MCNC: 2.98 UIU/ML — SIGNIFICANT CHANGE UP (ref 0.27–4.2)

## 2020-06-10 PROCEDURE — 73120 X-RAY EXAM OF HAND: CPT | Mod: 26,RT

## 2020-06-10 PROCEDURE — 99232 SBSQ HOSP IP/OBS MODERATE 35: CPT

## 2020-06-10 RX ORDER — IBUPROFEN 200 MG
400 TABLET ORAL EVERY 8 HOURS
Refills: 0 | Status: DISCONTINUED | OUTPATIENT
Start: 2020-06-10 | End: 2020-06-24

## 2020-06-10 RX ORDER — BACITRACIN ZINC 500 UNIT/G
1 OINTMENT IN PACKET (EA) TOPICAL
Refills: 0 | Status: COMPLETED | OUTPATIENT
Start: 2020-06-10 | End: 2020-06-17

## 2020-06-10 RX ADMIN — RISPERIDONE 1 MILLIGRAM(S): 4 TABLET ORAL at 20:15

## 2020-06-10 RX ADMIN — Medication 1 MILLIGRAM(S): at 08:12

## 2020-06-10 RX ADMIN — ESCITALOPRAM OXALATE 5 MILLIGRAM(S): 10 TABLET, FILM COATED ORAL at 08:12

## 2020-06-10 RX ADMIN — Medication 1 APPLICATION(S): at 22:00

## 2020-06-10 RX ADMIN — Medication 100 MILLIGRAM(S): at 08:12

## 2020-06-10 RX ADMIN — Medication 1 PATCH: at 08:12

## 2020-06-10 RX ADMIN — Medication 1 PATCH: at 20:15

## 2020-06-11 PROCEDURE — 99232 SBSQ HOSP IP/OBS MODERATE 35: CPT

## 2020-06-11 RX ORDER — RISPERIDONE 4 MG/1
2 TABLET ORAL AT BEDTIME
Refills: 0 | Status: DISCONTINUED | OUTPATIENT
Start: 2020-06-11 | End: 2020-06-12

## 2020-06-11 RX ORDER — ESCITALOPRAM OXALATE 10 MG/1
10 TABLET, FILM COATED ORAL DAILY
Refills: 0 | Status: DISCONTINUED | OUTPATIENT
Start: 2020-06-11 | End: 2020-06-15

## 2020-06-11 RX ADMIN — Medication 1 APPLICATION(S): at 21:19

## 2020-06-11 RX ADMIN — Medication 2 MILLIGRAM(S): at 16:15

## 2020-06-11 RX ADMIN — HALOPERIDOL DECANOATE 5 MILLIGRAM(S): 100 INJECTION INTRAMUSCULAR at 16:14

## 2020-06-11 RX ADMIN — Medication 400 MILLIGRAM(S): at 21:20

## 2020-06-11 RX ADMIN — Medication 25 MILLIGRAM(S): at 17:38

## 2020-06-11 RX ADMIN — ESCITALOPRAM OXALATE 5 MILLIGRAM(S): 10 TABLET, FILM COATED ORAL at 08:29

## 2020-06-11 RX ADMIN — Medication 1 MILLIGRAM(S): at 08:29

## 2020-06-11 RX ADMIN — Medication 50 MILLIGRAM(S): at 16:15

## 2020-06-11 RX ADMIN — Medication 100 MILLIGRAM(S): at 08:28

## 2020-06-11 RX ADMIN — Medication 400 MILLIGRAM(S): at 08:30

## 2020-06-11 RX ADMIN — Medication 1 PATCH: at 08:29

## 2020-06-11 RX ADMIN — RISPERIDONE 2 MILLIGRAM(S): 4 TABLET ORAL at 21:20

## 2020-06-11 RX ADMIN — Medication 1 APPLICATION(S): at 08:29

## 2020-06-11 RX ADMIN — Medication 2 MILLIGRAM(S): at 16:25

## 2020-06-11 NOTE — CHART NOTE - NSCHARTNOTEFT_GEN_A_CORE
Sutures due for removal. 2 nylon sutures removed from anterior chest. No signs of infections, bleeding, swelling, erythema. Edges well approximated. Will continue monitor.

## 2020-06-12 PROCEDURE — 99232 SBSQ HOSP IP/OBS MODERATE 35: CPT

## 2020-06-12 RX ORDER — RISPERIDONE 4 MG/1
3 TABLET ORAL AT BEDTIME
Refills: 0 | Status: DISCONTINUED | OUTPATIENT
Start: 2020-06-12 | End: 2020-06-15

## 2020-06-12 RX ADMIN — RISPERIDONE 3 MILLIGRAM(S): 4 TABLET ORAL at 20:16

## 2020-06-12 RX ADMIN — Medication 1 APPLICATION(S): at 08:27

## 2020-06-12 RX ADMIN — ESCITALOPRAM OXALATE 10 MILLIGRAM(S): 10 TABLET, FILM COATED ORAL at 08:27

## 2020-06-12 RX ADMIN — Medication 1 MILLIGRAM(S): at 08:27

## 2020-06-12 RX ADMIN — Medication 1 APPLICATION(S): at 20:16

## 2020-06-12 RX ADMIN — Medication 25 MILLIGRAM(S): at 15:10

## 2020-06-12 RX ADMIN — Medication 1 PATCH: at 08:27

## 2020-06-13 PROCEDURE — 99231 SBSQ HOSP IP/OBS SF/LOW 25: CPT

## 2020-06-13 RX ADMIN — ESCITALOPRAM OXALATE 10 MILLIGRAM(S): 10 TABLET, FILM COATED ORAL at 09:00

## 2020-06-13 RX ADMIN — Medication 1 APPLICATION(S): at 09:00

## 2020-06-13 RX ADMIN — Medication 1 PATCH: at 09:00

## 2020-06-13 RX ADMIN — Medication 1 PATCH: at 21:13

## 2020-06-13 RX ADMIN — RISPERIDONE 3 MILLIGRAM(S): 4 TABLET ORAL at 21:13

## 2020-06-13 RX ADMIN — Medication 1 APPLICATION(S): at 21:13

## 2020-06-13 RX ADMIN — Medication 1 MILLIGRAM(S): at 09:00

## 2020-06-14 PROCEDURE — 99231 SBSQ HOSP IP/OBS SF/LOW 25: CPT

## 2020-06-14 RX ADMIN — RISPERIDONE 3 MILLIGRAM(S): 4 TABLET ORAL at 21:09

## 2020-06-14 RX ADMIN — ESCITALOPRAM OXALATE 10 MILLIGRAM(S): 10 TABLET, FILM COATED ORAL at 08:49

## 2020-06-14 RX ADMIN — Medication 1 PATCH: at 21:09

## 2020-06-14 RX ADMIN — Medication 1 MILLIGRAM(S): at 08:49

## 2020-06-14 RX ADMIN — Medication 1 APPLICATION(S): at 21:09

## 2020-06-14 RX ADMIN — Medication 1 APPLICATION(S): at 08:48

## 2020-06-14 RX ADMIN — Medication 1 PATCH: at 08:49

## 2020-06-15 PROCEDURE — 99232 SBSQ HOSP IP/OBS MODERATE 35: CPT

## 2020-06-15 RX ORDER — ESCITALOPRAM OXALATE 10 MG/1
15 TABLET, FILM COATED ORAL DAILY
Refills: 0 | Status: DISCONTINUED | OUTPATIENT
Start: 2020-06-15 | End: 2020-06-24

## 2020-06-15 RX ORDER — TRAZODONE HCL 50 MG
50 TABLET ORAL AT BEDTIME
Refills: 0 | Status: DISCONTINUED | OUTPATIENT
Start: 2020-06-15 | End: 2020-06-24

## 2020-06-15 RX ORDER — RISPERIDONE 4 MG/1
4 TABLET ORAL AT BEDTIME
Refills: 0 | Status: DISCONTINUED | OUTPATIENT
Start: 2020-06-15 | End: 2020-06-22

## 2020-06-15 RX ADMIN — ESCITALOPRAM OXALATE 10 MILLIGRAM(S): 10 TABLET, FILM COATED ORAL at 08:48

## 2020-06-15 RX ADMIN — Medication 1 PATCH: at 08:48

## 2020-06-15 RX ADMIN — RISPERIDONE 4 MILLIGRAM(S): 4 TABLET ORAL at 20:29

## 2020-06-15 RX ADMIN — Medication 1 MILLIGRAM(S): at 08:48

## 2020-06-15 RX ADMIN — Medication 1 APPLICATION(S): at 08:47

## 2020-06-15 RX ADMIN — HALOPERIDOL DECANOATE 5 MILLIGRAM(S): 100 INJECTION INTRAMUSCULAR at 08:59

## 2020-06-15 RX ADMIN — Medication 50 MILLIGRAM(S): at 20:29

## 2020-06-15 RX ADMIN — Medication 1 APPLICATION(S): at 20:29

## 2020-06-16 PROCEDURE — 99232 SBSQ HOSP IP/OBS MODERATE 35: CPT

## 2020-06-16 RX ORDER — LOPERAMIDE HCL 2 MG
2 TABLET ORAL ONCE
Refills: 0 | Status: DISCONTINUED | OUTPATIENT
Start: 2020-06-16 | End: 2020-06-17

## 2020-06-16 RX ADMIN — Medication 25 MILLIGRAM(S): at 14:54

## 2020-06-16 RX ADMIN — Medication 1 PATCH: at 20:37

## 2020-06-16 RX ADMIN — Medication 1 PATCH: at 07:36

## 2020-06-16 RX ADMIN — ESCITALOPRAM OXALATE 15 MILLIGRAM(S): 10 TABLET, FILM COATED ORAL at 07:36

## 2020-06-16 RX ADMIN — Medication 50 MILLIGRAM(S): at 20:37

## 2020-06-16 RX ADMIN — RISPERIDONE 4 MILLIGRAM(S): 4 TABLET ORAL at 20:37

## 2020-06-17 PROCEDURE — 99232 SBSQ HOSP IP/OBS MODERATE 35: CPT

## 2020-06-17 RX ORDER — NALTREXONE HYDROCHLORIDE 50 MG/1
50 TABLET, FILM COATED ORAL DAILY
Refills: 0 | Status: DISCONTINUED | OUTPATIENT
Start: 2020-06-18 | End: 2020-06-24

## 2020-06-17 RX ADMIN — RISPERIDONE 4 MILLIGRAM(S): 4 TABLET ORAL at 20:30

## 2020-06-17 RX ADMIN — Medication 1 APPLICATION(S): at 20:30

## 2020-06-17 RX ADMIN — Medication 1 PATCH: at 08:43

## 2020-06-17 RX ADMIN — Medication 50 MILLIGRAM(S): at 20:30

## 2020-06-17 RX ADMIN — ESCITALOPRAM OXALATE 15 MILLIGRAM(S): 10 TABLET, FILM COATED ORAL at 08:43

## 2020-06-18 PROCEDURE — 99232 SBSQ HOSP IP/OBS MODERATE 35: CPT

## 2020-06-18 RX ADMIN — Medication 1 PATCH: at 08:56

## 2020-06-18 RX ADMIN — RISPERIDONE 4 MILLIGRAM(S): 4 TABLET ORAL at 20:38

## 2020-06-18 RX ADMIN — ESCITALOPRAM OXALATE 15 MILLIGRAM(S): 10 TABLET, FILM COATED ORAL at 08:56

## 2020-06-18 RX ADMIN — Medication 1 PATCH: at 19:38

## 2020-06-18 RX ADMIN — Medication 50 MILLIGRAM(S): at 20:38

## 2020-06-18 RX ADMIN — NALTREXONE HYDROCHLORIDE 50 MILLIGRAM(S): 50 TABLET, FILM COATED ORAL at 08:56

## 2020-06-19 PROCEDURE — 99232 SBSQ HOSP IP/OBS MODERATE 35: CPT

## 2020-06-19 RX ORDER — SIMETHICONE 80 MG/1
80 TABLET, CHEWABLE ORAL EVERY 8 HOURS
Refills: 0 | Status: DISCONTINUED | OUTPATIENT
Start: 2020-06-19 | End: 2020-06-24

## 2020-06-19 RX ADMIN — Medication 1 PATCH: at 08:19

## 2020-06-19 RX ADMIN — ESCITALOPRAM OXALATE 15 MILLIGRAM(S): 10 TABLET, FILM COATED ORAL at 08:19

## 2020-06-19 RX ADMIN — SIMETHICONE 80 MILLIGRAM(S): 80 TABLET, CHEWABLE ORAL at 21:30

## 2020-06-19 RX ADMIN — Medication 50 MILLIGRAM(S): at 20:11

## 2020-06-19 RX ADMIN — NALTREXONE HYDROCHLORIDE 50 MILLIGRAM(S): 50 TABLET, FILM COATED ORAL at 08:19

## 2020-06-19 RX ADMIN — RISPERIDONE 4 MILLIGRAM(S): 4 TABLET ORAL at 20:11

## 2020-06-20 RX ADMIN — RISPERIDONE 4 MILLIGRAM(S): 4 TABLET ORAL at 21:12

## 2020-06-20 RX ADMIN — Medication 1 PATCH: at 09:16

## 2020-06-20 RX ADMIN — Medication 50 MILLIGRAM(S): at 21:12

## 2020-06-20 RX ADMIN — ESCITALOPRAM OXALATE 15 MILLIGRAM(S): 10 TABLET, FILM COATED ORAL at 09:16

## 2020-06-20 RX ADMIN — NALTREXONE HYDROCHLORIDE 50 MILLIGRAM(S): 50 TABLET, FILM COATED ORAL at 09:16

## 2020-06-21 RX ADMIN — NALTREXONE HYDROCHLORIDE 50 MILLIGRAM(S): 50 TABLET, FILM COATED ORAL at 08:45

## 2020-06-21 RX ADMIN — ESCITALOPRAM OXALATE 15 MILLIGRAM(S): 10 TABLET, FILM COATED ORAL at 08:45

## 2020-06-21 RX ADMIN — Medication 1 PATCH: at 08:45

## 2020-06-22 PROCEDURE — 99232 SBSQ HOSP IP/OBS MODERATE 35: CPT

## 2020-06-22 RX ORDER — RISPERIDONE 4 MG/1
5 TABLET ORAL AT BEDTIME
Refills: 0 | Status: DISCONTINUED | OUTPATIENT
Start: 2020-06-22 | End: 2020-06-24

## 2020-06-22 RX ADMIN — NALTREXONE HYDROCHLORIDE 50 MILLIGRAM(S): 50 TABLET, FILM COATED ORAL at 08:57

## 2020-06-22 RX ADMIN — Medication 50 MILLIGRAM(S): at 20:53

## 2020-06-22 RX ADMIN — Medication 1 PATCH: at 08:57

## 2020-06-22 RX ADMIN — ESCITALOPRAM OXALATE 15 MILLIGRAM(S): 10 TABLET, FILM COATED ORAL at 08:57

## 2020-06-22 RX ADMIN — Medication 1 PATCH: at 07:38

## 2020-06-22 RX ADMIN — RISPERIDONE 5 MILLIGRAM(S): 4 TABLET ORAL at 20:53

## 2020-06-22 RX ADMIN — Medication 1 PATCH: at 08:20

## 2020-06-23 PROCEDURE — 99232 SBSQ HOSP IP/OBS MODERATE 35: CPT

## 2020-06-23 RX ORDER — RISPERIDONE 4 MG/1
1 TABLET ORAL
Qty: 30 | Refills: 0
Start: 2020-06-23 | End: 2020-07-22

## 2020-06-23 RX ORDER — NALTREXONE HYDROCHLORIDE 50 MG/1
1 TABLET, FILM COATED ORAL
Qty: 30 | Refills: 0
Start: 2020-06-23 | End: 2020-07-22

## 2020-06-23 RX ORDER — TRAZODONE HCL 50 MG
1 TABLET ORAL
Qty: 15 | Refills: 0
Start: 2020-06-23 | End: 2020-07-07

## 2020-06-23 RX ORDER — ALBUTEROL 90 UG/1
2 AEROSOL, METERED ORAL
Qty: 1 | Refills: 0
Start: 2020-06-23 | End: 2020-07-22

## 2020-06-23 RX ORDER — ESCITALOPRAM OXALATE 10 MG/1
3 TABLET, FILM COATED ORAL
Qty: 90 | Refills: 0
Start: 2020-06-23 | End: 2020-07-22

## 2020-06-23 RX ORDER — RISPERIDONE 4 MG/1
5 TABLET ORAL
Qty: 150 | Refills: 0
Start: 2020-06-23 | End: 2020-07-22

## 2020-06-23 RX ORDER — NICOTINE POLACRILEX 2 MG
1 GUM BUCCAL
Qty: 30 | Refills: 0
Start: 2020-06-23 | End: 2020-07-22

## 2020-06-23 RX ADMIN — ESCITALOPRAM OXALATE 15 MILLIGRAM(S): 10 TABLET, FILM COATED ORAL at 08:06

## 2020-06-23 RX ADMIN — Medication 1 PATCH: at 20:52

## 2020-06-23 RX ADMIN — Medication 1 PATCH: at 08:06

## 2020-06-23 RX ADMIN — RISPERIDONE 5 MILLIGRAM(S): 4 TABLET ORAL at 20:52

## 2020-06-23 RX ADMIN — Medication 50 MILLIGRAM(S): at 20:52

## 2020-06-23 RX ADMIN — NALTREXONE HYDROCHLORIDE 50 MILLIGRAM(S): 50 TABLET, FILM COATED ORAL at 08:06

## 2020-06-24 VITALS — TEMPERATURE: 97 F

## 2020-06-24 PROCEDURE — 99238 HOSP IP/OBS DSCHRG MGMT 30/<: CPT

## 2020-06-24 RX ORDER — LOPERAMIDE HCL 2 MG
2 TABLET ORAL ONCE
Refills: 0 | Status: COMPLETED | OUTPATIENT
Start: 2020-06-24 | End: 2020-06-24

## 2020-06-24 RX ADMIN — Medication 1 PATCH: at 08:06

## 2020-06-24 RX ADMIN — ESCITALOPRAM OXALATE 15 MILLIGRAM(S): 10 TABLET, FILM COATED ORAL at 08:06

## 2020-06-24 RX ADMIN — NALTREXONE HYDROCHLORIDE 50 MILLIGRAM(S): 50 TABLET, FILM COATED ORAL at 08:06

## 2020-06-24 RX ADMIN — Medication 2 MILLIGRAM(S): at 09:56

## 2021-05-27 ENCOUNTER — TRANSCRIPTION ENCOUNTER (OUTPATIENT)
Age: 36
End: 2021-05-27

## 2021-09-06 ENCOUNTER — TRANSCRIPTION ENCOUNTER (OUTPATIENT)
Age: 36
End: 2021-09-06

## 2021-09-06 ENCOUNTER — INPATIENT (INPATIENT)
Facility: HOSPITAL | Age: 36
LOS: 4 days | Discharge: HOME | DRG: 338 | End: 2021-09-11
Attending: SURGERY | Admitting: SURGERY
Payer: MEDICAID

## 2021-09-06 VITALS
DIASTOLIC BLOOD PRESSURE: 81 MMHG | HEART RATE: 104 BPM | SYSTOLIC BLOOD PRESSURE: 120 MMHG | OXYGEN SATURATION: 95 % | RESPIRATION RATE: 18 BRPM | TEMPERATURE: 100 F | HEIGHT: 69 IN

## 2021-09-06 LAB
APPEARANCE UR: CLEAR — SIGNIFICANT CHANGE UP
BASOPHILS # BLD AUTO: 0.03 K/UL — SIGNIFICANT CHANGE UP (ref 0–0.2)
BASOPHILS NFR BLD AUTO: 0.2 % — SIGNIFICANT CHANGE UP (ref 0–2)
BILIRUB UR-MCNC: NEGATIVE — SIGNIFICANT CHANGE UP
COLOR SPEC: YELLOW — SIGNIFICANT CHANGE UP
DIFF PNL FLD: ABNORMAL
EOSINOPHIL # BLD AUTO: 0 K/UL — SIGNIFICANT CHANGE UP (ref 0–0.5)
EOSINOPHIL NFR BLD AUTO: 0 % — SIGNIFICANT CHANGE UP (ref 0–6)
EPI CELLS # UR: SIGNIFICANT CHANGE UP
GLUCOSE UR QL: NEGATIVE MG/DL — SIGNIFICANT CHANGE UP
HCT VFR BLD CALC: 43.3 % — SIGNIFICANT CHANGE UP (ref 39–50)
HGB BLD-MCNC: 14.9 G/DL — SIGNIFICANT CHANGE UP (ref 13–17)
IMM GRANULOCYTES NFR BLD AUTO: 0.6 % — SIGNIFICANT CHANGE UP (ref 0–1.5)
KETONES UR-MCNC: ABNORMAL
LEUKOCYTE ESTERASE UR-ACNC: ABNORMAL
LYMPHOCYTES # BLD AUTO: 1.22 K/UL — SIGNIFICANT CHANGE UP (ref 1–3.3)
LYMPHOCYTES # BLD AUTO: 7 % — LOW (ref 13–44)
MCHC RBC-ENTMCNC: 31.2 PG — SIGNIFICANT CHANGE UP (ref 27–34)
MCHC RBC-ENTMCNC: 34.4 GM/DL — SIGNIFICANT CHANGE UP (ref 32–36)
MCV RBC AUTO: 90.6 FL — SIGNIFICANT CHANGE UP (ref 80–100)
MONOCYTES # BLD AUTO: 1.38 K/UL — HIGH (ref 0–0.9)
MONOCYTES NFR BLD AUTO: 7.9 % — SIGNIFICANT CHANGE UP (ref 2–14)
NEUTROPHILS # BLD AUTO: 14.7 K/UL — HIGH (ref 1.8–7.4)
NEUTROPHILS NFR BLD AUTO: 84.3 % — HIGH (ref 43–77)
NITRITE UR-MCNC: NEGATIVE — SIGNIFICANT CHANGE UP
PH UR: 8 — SIGNIFICANT CHANGE UP (ref 5–8)
PLATELET # BLD AUTO: 236 K/UL — SIGNIFICANT CHANGE UP (ref 150–400)
PROT UR-MCNC: 30 MG/DL
RBC # BLD: 4.78 M/UL — SIGNIFICANT CHANGE UP (ref 4.2–5.8)
RBC # FLD: 13.2 % — SIGNIFICANT CHANGE UP (ref 10.3–14.5)
RBC CASTS # UR COMP ASSIST: ABNORMAL /HPF (ref 0–4)
SP GR SPEC: 1.01 — SIGNIFICANT CHANGE UP (ref 1.01–1.02)
UROBILINOGEN FLD QL: 4 MG/DL
WBC # BLD: 17.44 K/UL — HIGH (ref 3.8–10.5)
WBC # FLD AUTO: 17.44 K/UL — HIGH (ref 3.8–10.5)
WBC UR QL: SIGNIFICANT CHANGE UP

## 2021-09-06 RX ORDER — ONDANSETRON 8 MG/1
4 TABLET, FILM COATED ORAL ONCE
Refills: 0 | Status: COMPLETED | OUTPATIENT
Start: 2021-09-06 | End: 2021-09-06

## 2021-09-06 RX ORDER — KETOROLAC TROMETHAMINE 30 MG/ML
15 SYRINGE (ML) INJECTION ONCE
Refills: 0 | Status: DISCONTINUED | OUTPATIENT
Start: 2021-09-06 | End: 2021-09-06

## 2021-09-06 RX ADMIN — Medication 15 MILLIGRAM(S): at 23:37

## 2021-09-06 RX ADMIN — ONDANSETRON 4 MILLIGRAM(S): 8 TABLET, FILM COATED ORAL at 23:37

## 2021-09-06 NOTE — ED PROVIDER NOTE - CLINICAL SUMMARY MEDICAL DECISION MAKING FREE TEXT BOX
37yo M w/pmh depression, substance abuse, asthma presents for RUQ pain x1d. Labs pending. Zofran, toradol for symptoms.

## 2021-09-06 NOTE — ED PROVIDER NOTE - PHYSICAL EXAMINATION
General: well appearing, NAD  Head: NC/AT  Cardiac: RRR, no m/r/g  Respiratory: CTABL, equal chest wall expansions, no conversational dyspnea  Abdomen: soft, ND, +RUQ ttp, no CVA tenderness  Neuro: AAOx3,coordinated movement of all 4 extremities  Psych: calm, cooperative, normal affect  Skin: warm and dry

## 2021-09-06 NOTE — ED ADULT TRIAGE NOTE - CHIEF COMPLAINT QUOTE
PT C/O RLQ abd pain that radiates to the flank that began yesterday.  +nausea, vomiting and fevers. PT is diaphoretic in triage.  No urinary symptoms.  Took Motrin 2 hours ago.

## 2021-09-06 NOTE — ED PROVIDER NOTE - OBJECTIVE STATEMENT
35yo M w/pmh depression, substance abuse, asthma presents for RUQ pain x1d. Onset 7am this morning, intermittent, pleuritic, worse w/ moving or twisting. No change w/ food. Assoc/w nausea and subjective fever. Denies CP, SOB, vomiting, dysuria, hematuria. Never had pain like this before. Reports he drinks a 6-pack on weekends. +marijuana user. Denies smoking, other drugs.

## 2021-09-06 NOTE — ED PROVIDER NOTE - ATTENDING CONTRIBUTION TO CARE
35 yo well appearing male with no significant medical history presenting with acute and persistent right flank/upper abdominal pain earlier today. Patient in mild painful distress, with right quadrant tenderness. I personally saw the patient with the resident, and completed the key components of the history and physical exam. I then discussed the management plan with the resident.

## 2021-09-06 NOTE — ED PROVIDER NOTE - NS ED ROS FT
Constitutional: +fever, no sweats, and no chills.  CV: no chest pain, no edema.  Resp: no cough, no dyspnea  GI: +abdominal pain, +nausea and no vomiting.  MSK: no msk pain, no weakness  Skin: no lesions, and no rashes.  Neuro: no LOC, no headache, no sensory deficits, and no dizziness  ROS otherwise negative except as noted in HPI.

## 2021-09-07 ENCOUNTER — TRANSCRIPTION ENCOUNTER (OUTPATIENT)
Age: 36
End: 2021-09-07

## 2021-09-07 ENCOUNTER — RESULT REVIEW (OUTPATIENT)
Age: 36
End: 2021-09-07

## 2021-09-07 DIAGNOSIS — K37 UNSPECIFIED APPENDICITIS: ICD-10-CM

## 2021-09-07 LAB
ALBUMIN SERPL ELPH-MCNC: 4.5 G/DL — SIGNIFICANT CHANGE UP (ref 3.3–5.2)
ALP SERPL-CCNC: 61 U/L — SIGNIFICANT CHANGE UP (ref 40–120)
ALT FLD-CCNC: 16 U/L — SIGNIFICANT CHANGE UP
ANION GAP SERPL CALC-SCNC: 18 MMOL/L — HIGH (ref 5–17)
APTT BLD: 27.8 SEC — SIGNIFICANT CHANGE UP (ref 27.5–35.5)
AST SERPL-CCNC: 19 U/L — SIGNIFICANT CHANGE UP
BILIRUB SERPL-MCNC: 0.8 MG/DL — SIGNIFICANT CHANGE UP (ref 0.4–2)
BLD GP AB SCN SERPL QL: SIGNIFICANT CHANGE UP
BUN SERPL-MCNC: 17.8 MG/DL — SIGNIFICANT CHANGE UP (ref 8–20)
CALCIUM SERPL-MCNC: 9.6 MG/DL — SIGNIFICANT CHANGE UP (ref 8.6–10.2)
CHLORIDE SERPL-SCNC: 95 MMOL/L — LOW (ref 98–107)
CO2 SERPL-SCNC: 26 MMOL/L — SIGNIFICANT CHANGE UP (ref 22–29)
CREAT SERPL-MCNC: 1.21 MG/DL — SIGNIFICANT CHANGE UP (ref 0.5–1.3)
GLUCOSE SERPL-MCNC: 123 MG/DL — HIGH (ref 70–99)
INR BLD: 1.09 RATIO — SIGNIFICANT CHANGE UP (ref 0.88–1.16)
LIDOCAIN IGE QN: 17 U/L — LOW (ref 22–51)
POTASSIUM SERPL-MCNC: 4 MMOL/L — SIGNIFICANT CHANGE UP (ref 3.5–5.3)
POTASSIUM SERPL-SCNC: 4 MMOL/L — SIGNIFICANT CHANGE UP (ref 3.5–5.3)
PROT SERPL-MCNC: 7.8 G/DL — SIGNIFICANT CHANGE UP (ref 6.6–8.7)
PROTHROM AB SERPL-ACNC: 12.6 SEC — SIGNIFICANT CHANGE UP (ref 10.6–13.6)
SARS-COV-2 RNA SPEC QL NAA+PROBE: SIGNIFICANT CHANGE UP
SODIUM SERPL-SCNC: 139 MMOL/L — SIGNIFICANT CHANGE UP (ref 135–145)

## 2021-09-07 PROCEDURE — 99222 1ST HOSP IP/OBS MODERATE 55: CPT | Mod: 25

## 2021-09-07 PROCEDURE — 88304 TISSUE EXAM BY PATHOLOGIST: CPT | Mod: 26

## 2021-09-07 PROCEDURE — G1004: CPT

## 2021-09-07 PROCEDURE — 44970 LAPAROSCOPY APPENDECTOMY: CPT | Mod: AS

## 2021-09-07 PROCEDURE — 74176 CT ABD & PELVIS W/O CONTRAST: CPT | Mod: 26,MG

## 2021-09-07 PROCEDURE — 44970 LAPAROSCOPY APPENDECTOMY: CPT

## 2021-09-07 RX ORDER — SODIUM CHLORIDE 9 MG/ML
1000 INJECTION, SOLUTION INTRAVENOUS
Refills: 0 | Status: DISCONTINUED | OUTPATIENT
Start: 2021-09-07 | End: 2021-09-07

## 2021-09-07 RX ORDER — FENTANYL CITRATE 50 UG/ML
50 INJECTION INTRAVENOUS
Refills: 0 | Status: DISCONTINUED | OUTPATIENT
Start: 2021-09-07 | End: 2021-09-07

## 2021-09-07 RX ORDER — INFLUENZA VIRUS VACCINE 15; 15; 15; 15 UG/.5ML; UG/.5ML; UG/.5ML; UG/.5ML
0.5 SUSPENSION INTRAMUSCULAR ONCE
Refills: 0 | Status: DISCONTINUED | OUTPATIENT
Start: 2021-09-07 | End: 2021-09-11

## 2021-09-07 RX ORDER — SODIUM CHLORIDE 9 MG/ML
1000 INJECTION, SOLUTION INTRAVENOUS
Refills: 0 | Status: DISCONTINUED | OUTPATIENT
Start: 2021-09-07 | End: 2021-09-08

## 2021-09-07 RX ORDER — ONDANSETRON 8 MG/1
4 TABLET, FILM COATED ORAL ONCE
Refills: 0 | Status: DISCONTINUED | OUTPATIENT
Start: 2021-09-07 | End: 2021-09-07

## 2021-09-07 RX ORDER — KETOROLAC TROMETHAMINE 30 MG/ML
15 SYRINGE (ML) INJECTION EVERY 6 HOURS
Refills: 0 | Status: DISCONTINUED | OUTPATIENT
Start: 2021-09-07 | End: 2021-09-07

## 2021-09-07 RX ORDER — PIPERACILLIN AND TAZOBACTAM 4; .5 G/20ML; G/20ML
3.38 INJECTION, POWDER, LYOPHILIZED, FOR SOLUTION INTRAVENOUS EVERY 8 HOURS
Refills: 0 | Status: DISCONTINUED | OUTPATIENT
Start: 2021-09-07 | End: 2021-09-07

## 2021-09-07 RX ORDER — SODIUM CHLORIDE 9 MG/ML
1000 INJECTION INTRAMUSCULAR; INTRAVENOUS; SUBCUTANEOUS
Refills: 0 | Status: DISCONTINUED | OUTPATIENT
Start: 2021-09-07 | End: 2021-09-07

## 2021-09-07 RX ORDER — SENNA PLUS 8.6 MG/1
2 TABLET ORAL AT BEDTIME
Refills: 0 | Status: DISCONTINUED | OUTPATIENT
Start: 2021-09-07 | End: 2021-09-11

## 2021-09-07 RX ORDER — MORPHINE SULFATE 50 MG/1
4 CAPSULE, EXTENDED RELEASE ORAL ONCE
Refills: 0 | Status: DISCONTINUED | OUTPATIENT
Start: 2021-09-07 | End: 2021-09-07

## 2021-09-07 RX ORDER — KETOROLAC TROMETHAMINE 30 MG/ML
15 SYRINGE (ML) INJECTION ONCE
Refills: 0 | Status: DISCONTINUED | OUTPATIENT
Start: 2021-09-07 | End: 2021-09-07

## 2021-09-07 RX ORDER — MORPHINE SULFATE 50 MG/1
2 CAPSULE, EXTENDED RELEASE ORAL ONCE
Refills: 0 | Status: DISCONTINUED | OUTPATIENT
Start: 2021-09-07 | End: 2021-09-07

## 2021-09-07 RX ORDER — ENOXAPARIN SODIUM 100 MG/ML
40 INJECTION SUBCUTANEOUS DAILY
Refills: 0 | Status: DISCONTINUED | OUTPATIENT
Start: 2021-09-07 | End: 2021-09-11

## 2021-09-07 RX ORDER — PIPERACILLIN AND TAZOBACTAM 4; .5 G/20ML; G/20ML
3.38 INJECTION, POWDER, LYOPHILIZED, FOR SOLUTION INTRAVENOUS EVERY 8 HOURS
Refills: 0 | Status: DISCONTINUED | OUTPATIENT
Start: 2021-09-07 | End: 2021-09-11

## 2021-09-07 RX ORDER — ONDANSETRON 8 MG/1
4 TABLET, FILM COATED ORAL EVERY 6 HOURS
Refills: 0 | Status: DISCONTINUED | OUTPATIENT
Start: 2021-09-07 | End: 2021-09-11

## 2021-09-07 RX ORDER — PIPERACILLIN AND TAZOBACTAM 4; .5 G/20ML; G/20ML
3.38 INJECTION, POWDER, LYOPHILIZED, FOR SOLUTION INTRAVENOUS ONCE
Refills: 0 | Status: DISCONTINUED | OUTPATIENT
Start: 2021-09-07 | End: 2021-09-07

## 2021-09-07 RX ORDER — HYDROMORPHONE HYDROCHLORIDE 2 MG/ML
0.5 INJECTION INTRAMUSCULAR; INTRAVENOUS; SUBCUTANEOUS
Refills: 0 | Status: DISCONTINUED | OUTPATIENT
Start: 2021-09-07 | End: 2021-09-07

## 2021-09-07 RX ORDER — ACETAMINOPHEN 500 MG
650 TABLET ORAL EVERY 6 HOURS
Refills: 0 | Status: DISCONTINUED | OUTPATIENT
Start: 2021-09-07 | End: 2021-09-11

## 2021-09-07 RX ADMIN — Medication 15 MILLIGRAM(S): at 02:47

## 2021-09-07 RX ADMIN — Medication 650 MILLIGRAM(S): at 18:34

## 2021-09-07 RX ADMIN — Medication 15 MILLIGRAM(S): at 15:03

## 2021-09-07 RX ADMIN — Medication 15 MILLIGRAM(S): at 06:01

## 2021-09-07 RX ADMIN — PIPERACILLIN AND TAZOBACTAM 25 GRAM(S): 4; .5 INJECTION, POWDER, LYOPHILIZED, FOR SOLUTION INTRAVENOUS at 18:04

## 2021-09-07 RX ADMIN — Medication 650 MILLIGRAM(S): at 23:08

## 2021-09-07 RX ADMIN — MORPHINE SULFATE 2 MILLIGRAM(S): 50 CAPSULE, EXTENDED RELEASE ORAL at 02:47

## 2021-09-07 RX ADMIN — SENNA PLUS 2 TABLET(S): 8.6 TABLET ORAL at 21:56

## 2021-09-07 RX ADMIN — MORPHINE SULFATE 4 MILLIGRAM(S): 50 CAPSULE, EXTENDED RELEASE ORAL at 06:02

## 2021-09-07 RX ADMIN — Medication 650 MILLIGRAM(S): at 18:04

## 2021-09-07 RX ADMIN — MORPHINE SULFATE 2 MILLIGRAM(S): 50 CAPSULE, EXTENDED RELEASE ORAL at 06:01

## 2021-09-07 RX ADMIN — MORPHINE SULFATE 4 MILLIGRAM(S): 50 CAPSULE, EXTENDED RELEASE ORAL at 05:13

## 2021-09-07 RX ADMIN — SODIUM CHLORIDE 75 MILLILITER(S): 9 INJECTION, SOLUTION INTRAVENOUS at 15:03

## 2021-09-07 RX ADMIN — HYDROMORPHONE HYDROCHLORIDE 0.5 MILLIGRAM(S): 2 INJECTION INTRAMUSCULAR; INTRAVENOUS; SUBCUTANEOUS at 13:48

## 2021-09-07 NOTE — ASU PREOP CHECKLIST - VIA
Lab Results   Component Value Date    HGBA1C 6 7 (H) 09/01/2021       Recent Labs     09/01/21  1850 09/02/21  0606 09/02/21  1040   POCGLU 112 123 142*       Blood Sugar Average: Last 72 hrs:  · Takes metformin at home, refuses insulin at home  · SSI and hypoglycemia protocol while inpatient stretcher

## 2021-09-07 NOTE — CHART NOTE - NSCHARTNOTEFT_GEN_A_CORE
Post-op Check    Subjective:  Pt offers no acute complaints at this time. Pain well controlled on current regiment. Denies chest pain, SOB, palpitations. Patient voiding spontaneously, tolerating a diet, passing flatus, ambulating with no difficulties.     STATUS POST:  Laparoscopic appendectomy  with staple of appendix base over sew staple line. with copious irrigation     POST OPERATIVE DAY #: 0    MEDICATIONS  (STANDING):  acetaminophen   Tablet .. 650 milliGRAM(s) Oral every 6 hours  enoxaparin Injectable 40 milliGRAM(s) SubCutaneous daily  influenza   Vaccine 0.5 milliLiter(s) IntraMuscular once  lactated ringers. 1000 milliLiter(s) (75 mL/Hr) IV Continuous <Continuous>  piperacillin/tazobactam IVPB.. 3.375 Gram(s) IV Intermittent every 8 hours  senna 2 Tablet(s) Oral at bedtime    MEDICATIONS  (PRN):  ondansetron Injectable 4 milliGRAM(s) IV Push every 6 hours PRN Nausea      Vital Signs Last 24 Hrs  T(C): 37.2 (07 Sep 2021 22:01), Max: 37.2 (07 Sep 2021 08:29)  T(F): 98.9 (07 Sep 2021 22:01), Max: 99 (07 Sep 2021 08:29)  HR: 83 (07 Sep 2021 22:01) (77 - 95)  BP: 102/62 (07 Sep 2021 22:01) (102/62 - 132/92)  BP(mean): --  RR: 18 (07 Sep 2021 22:01) (12 - 20)  SpO2: 93% (07 Sep 2021 22:01) (93% - 100%)    Physical Exam:    Constitutional: NAD  HEENT: PERRL, EOMI  Neck: No JVD, FROM without pain  Respiratory: no accessory muscle use, respirations non-labored  GI: abdomen softly distended, trochanter sites c.d.i, no guarding, no peritonitis  Neurological: A&O x 3; without gross deficit    A:     P:  Continue current care  serial abdominal exams  regular diet  continue IV abx  monitor leukocytosis  Pain control  OOB as tolerated  Encourage IS  DVT ppx

## 2021-09-07 NOTE — H&P ADULT - HISTORY OF PRESENT ILLNESS
ACUTE CARE SURGERY CONSULT    HPI:    37 yo male with a PMH of depression, and asthma, who presents to the ED with a chief complaint of abdominal pain. He states that the pain began at 7 am this morning, and it is located in the right upper quadrant. It is an intermittent pain that is worse with moving, and there is no change with food. It is associated with nausea but no vomiting. He endorses that this is his first episode. He endorses alcohol and marijuana use, but denies any other drug use.     In the ED, labwork was notable for a WBC of 17. CT scan was notable for a dilated appendix, measuring up to 1.5 cm with wall thickening and surrounding inflammatory change consistent with acute appendicitis. General surgery was consulted for further management.     PAST MEDICAL HISTORY:  No pertinent past medical history    No pertinent past medical history    Asthma        PAST SURGICAL HISTORY:  No significant past surgical history      ALLERGIES:  latex (Unknown)  No Known Drug Allergies      FAMILY HISTORY: Noncontributory    SOCIAL HISTORY: Denies tobacco, EtOH, illicit substance use.     HOME MEDICATIONS:    MEDICATIONS  (STANDING):    MEDICATIONS  (PRN):      VITALS & I/Os:  Vital Signs Last 24 Hrs  T(C): 36.7 (07 Sep 2021 04:45), Max: 37.8 (06 Sep 2021 21:40)  T(F): 98.1 (07 Sep 2021 04:45), Max: 100.1 (06 Sep 2021 21:40)  HR: 91 (07 Sep 2021 04:45) (91 - 104)  BP: 115/71 (07 Sep 2021 04:45) (115/71 - 120/81)  BP(mean): --  RR: 18 (06 Sep 2021 21:40) (18 - 18)  SpO2: 95% (07 Sep 2021 04:45) (95% - 95%)  CAPILLARY BLOOD GLUCOSE      I&O's Summary      GENERAL: Alert, well developed, in no acute distress.  RESPIRATORY: Nonlabored on RA  CARDIOVASCULAR: RRR.   GASTROINTESTINAL: Diffusely tender to palpation  MUSCULOSKELETAL: No cyanosis or clubbing. No gross deformities.     LABS:                        14.9   17.44 )-----------( 236      ( 06 Sep 2021 23:14 )             43.3     09-06    139  |  95<L>  |  17.8  ----------------------------<  123<H>  4.0   |  26.0  |  1.21    Ca    9.6      06 Sep 2021 23:14    TPro  7.8  /  Alb  4.5  /  TBili  0.8  /  DBili  x   /  AST  19  /  ALT  16  /  AlkPhos  61  09-06    Lactate:    PT/INR - ( 07 Sep 2021 05:51 )   PT: 12.6 sec;   INR: 1.09 ratio         PTT - ( 07 Sep 2021 05:51 )  PTT:27.8 sec          Urinalysis Basic - ( 06 Sep 2021 23:15 )    Color: Yellow / Appearance: Clear / S.015 / pH: x  Gluc: x / Ketone: Trace  / Bili: Negative / Urobili: 4 mg/dL   Blood: x / Protein: 30 mg/dL / Nitrite: Negative   Leuk Esterase: Trace / RBC: 3-5 /HPF / WBC 0-2   Sq Epi: x / Non Sq Epi: Occasional / Bacteria: x        IMAGING:     EXAM:  CT RENAL STONE HUNT                          PROCEDURE DATE:  2021          INTERPRETATION:  CLINICAL INDICATION:  RUQ pain, +blood in urine    COMPARISON: There are no prior studies available for comparison.      TECHNIQUE: CT imaging of the abdomen and pelvis was performed without IV contrast.    FINDINGS:    LOWER CHEST: Subsegmental atelectasis.    ABDOMEN:    Evaluation of the visceral organs and bowel is limited due to lack of IV contrast.    LIVER: within normal limits.  BILE DUCTS: normal caliber.  GALLBLADDER: No calcified gallstones. Normal caliber wall.  PANCREAS: within normal limits.  SPLEEN: within normal limits.  ADRENALS: within normal limits.  KIDNEYS/URETERS: within normal limits.    PELVIS:  REPRODUCTIVE ORGANS: no pelvic masses.  BLADDER: within normal limits.      BOWEL: The appendix is dilated, measuring up to 1.5 cm with wall thickening and surrounding inflammatory change consistent with acute appendicitis. No drainable collection. No small bowel obstruction. No enlarged mesenteric lymph nodes.  PERITONEUM: no ascites or free air, no fluid collection.  VESSELS: within normal limits.  RETROPERITONEUM: within normal limits.  ABDOMINAL WALL: within normal limits.  BONES: within normal limits.    IMPRESSION:    Acute appendicitis. No drainable collection.        --- End of Report ---            ALMA STEPHEN MD; Attending Radiologist  This document has been electronically signed. Sep  7 2021  3:53AM

## 2021-09-07 NOTE — DISCHARGE NOTE PROVIDER - NSDCCPCAREPLAN_GEN_ALL_CORE_FT
PRINCIPAL DISCHARGE DIAGNOSIS  Diagnosis: Appendicitis  Assessment and Plan of Treatment:   BATHING: Please do not submerge wound underwater. You may shower and/or sponge bathe.   ACTIVITY: No heavy lifting or straining. Otherwise, you may return to your usual level of physical activity. If you are taking narcotic pain medication (such as Percocet) DO NOT drive a car, operate machinery or make important decisions.  DIET: Return to your usual diet.  NOTIFY YOUR SURGEON IF: You have any bleeding that does not stop, any pus draining from your wound(s), any fever (over 100.4 F) or chills, persistent nausea/vomiting, persistent diarrhea, or if your pain is not controlled on your discharge pain medications.  FOLLOW-UP: Please follow up with your primary care physician and Acute Care Surgery clinic [phone] in 10-14 days regarding your hospitalization. Call for appointment upon discharge.

## 2021-09-07 NOTE — H&P ADULT - ASSESSMENT
35 yo male with signs and symptoms consistent with acute appendicitis.     - Admit to surgery  - NPO/IVF  - IV Abx  - Lap appy this admission    Discussed with Lonnie Espinal MD PGY5

## 2021-09-07 NOTE — BRIEF OPERATIVE NOTE - NSICDXBRIEFPROCEDURE_GEN_ALL_CORE_FT
PROCEDURES:  Laparoscopic appendectomy 07-Sep-2021 11:01:22 with staple of appendix base over sew staple line. with copious irrigation 15 minutes Perfecto Downey

## 2021-09-07 NOTE — DISCHARGE NOTE PROVIDER - NSDCMRMEDTOKEN_GEN_ALL_CORE_FT
albuterol 90 mcg/inh inhalation aerosol: 2 puff(s) inhaled every 6 hours, As needed, asthma  escitalopram 5 mg oral tablet: 3 tab(s) orally once a day  Motrin 600 mg oral tablet: 1 tab(s) orally every 6 hours  naltrexone 50 mg oral tablet: 1 tab(s) orally once a day  nicotine 7 mg/24 hr transdermal film, extended release: 1 patch transdermal once a day   risperiDONE 1 mg oral tablet: 1 tab(s) orally once a day (at bedtime)   risperiDONE 4 mg oral tablet: 1 tab(s) orally once a day (at bedtime)   traZODone 50 mg oral tablet: 1 tab(s) orally once a day (at bedtime)  Tylenol 500 mg oral tablet: 2 tab(s) orally every 8 hours, As Needed   albuterol 90 mcg/inh inhalation aerosol: 2 puff(s) inhaled every 6 hours, As needed, asthma  amoxicillin-clavulanate 875 mg-125 mg oral tablet: 1 tab(s) orally 2 times a day   escitalopram 5 mg oral tablet: 3 tab(s) orally once a day  Motrin 600 mg oral tablet: 1 tab(s) orally every 6 hours  naltrexone 50 mg oral tablet: 1 tab(s) orally once a day  nicotine 7 mg/24 hr transdermal film, extended release: 1 patch transdermal once a day   risperiDONE 1 mg oral tablet: 1 tab(s) orally once a day (at bedtime)   risperiDONE 4 mg oral tablet: 1 tab(s) orally once a day (at bedtime)   traZODone 50 mg oral tablet: 1 tab(s) orally once a day (at bedtime)  Tylenol 500 mg oral tablet: 2 tab(s) orally every 8 hours, As Needed

## 2021-09-07 NOTE — ED ADULT NURSE REASSESSMENT NOTE - NS ED NURSE REASSESS COMMENT FT1
Report received from off going RN. Patient is a&ox3, patient is aware that he is going to the OR at this time. Report given to NIRANJAN Dominguez in the OR. Patient left with transport at this time.

## 2021-09-07 NOTE — BRIEF OPERATIVE NOTE - NSICDXBRIEFPOSTOP_GEN_ALL_CORE_FT
POST-OP DIAGNOSIS:  Acute gangrenous appendicitis with perforation and peritonitis 07-Sep-2021 11:03:53 walled off collection Perfecto Downey

## 2021-09-07 NOTE — DISCHARGE NOTE PROVIDER - CARE PROVIDER_API CALL
Emily Villaseñor (MD)  Surgery; Surgical Critical Care  [address] [place] [id number]  Phone: [phone]  Fax: [phone]  Follow Up Time: 1 week

## 2021-09-07 NOTE — DISCHARGE NOTE PROVIDER - NSFOLLOWUPCLINICS_GEN_ALL_ED_FT
[facility] Acute Care Surgery  Acute Care Surgery  [address] [place] [id number]  Phone: [phone]  Fax:   Follow Up Time: 2 weeks

## 2021-09-07 NOTE — DISCHARGE NOTE PROVIDER - HOSPITAL COURSE
Patient was found to have Acute Appendicitis and was admitted to undergo Laparoscopic Appendectomy.  Patient tolerated the procedure well and was transferred to the floor in stable condition.  On POD #1, the patients diet was advanced as tolerated and was placed on PO pain medication.  Once patient was ambulating well, voiding without difficulty, and tolerating a full diet, they were found to be stable for discharge to home.  Pain was well-controlled at time of discharge. Patient was found to have Acute Appendicitis and was admitted to undergo Laparoscopic Appendectomy.  Patient tolerated the procedure well and was transferred to the floor in stable condition.  Patient found to have an acute gangrenous perforated gallbladder. To Med/surg floor thereafter. Post operative care as expected and patient's diet advanced, pain controlled and able to carry out ADLs. Patient now stable for discharge to home.    Patient tolerated operation well and there were NO post-operative complications identified. Patient was found to have Acute Appendicitis and was admitted to undergo Laparoscopic Appendectomy. Patient tolerated operation well and there were NO post-operative complications identified. Patient was transferred to the floor in stable condition. Patient found to have an acute gangrenous perforated gallbladder. To Med/surg floor thereafter. Patient remained on Zosyn for 4 days post-op. Post operative care as expected and patient's diet advanced, pain controlled and able to carry out ADLs. Patient now stable for discharge to home. Patient was found to have Acute Appendicitis and was admitted to undergo Laparoscopic Appendectomy. Patient tolerated operation well and there were NO post-operative complications identified. Patient was transferred to the floor in stable condition. Patient found to have an acute gangrenous perforated gallbladder. To Med/surg floor thereafter. Patient remained on Zosyn for 4 days post-op. Post operative care as expected and patient's diet advanced, pain controlled and able to carry out ADLs. Patient now stable for discharge to home.    Length of time preparing discharge > 30 minutes  Patient tolerated operation well and there were NO post-operative complications identified.

## 2021-09-07 NOTE — BRIEF OPERATIVE NOTE - OPERATION/FINDINGS
acute gangrenous, perforated  appendix with walled off collection.  irrigation area and staple base of appendix and over sew staple line. Then followed with copious irrigation RLQ 15 minutes.

## 2021-09-07 NOTE — CHART NOTE - NSCHARTNOTEFT_GEN_A_CORE
Post-op check    Patient seen and examined, s/p laparoscopic appendectomy, with intraoperative findings of perforated appendicitis  Tolerated procedure well  Tolerating diet  Pain controlled  Voiding    T(C): 37 (09-07-21 @ 15:15), Max: 37.8 (09-06-21 @ 21:40)  HR: 88 (09-07-21 @ 15:30) (83 - 104)  BP: 115/61 (09-07-21 @ 15:30) (106/57 - 132/92)  RR: 16 (09-07-21 @ 15:30) (12 - 20)  SpO2: 96% (09-07-21 @ 15:30) (95% - 100%)      acetaminophen   Tablet .. 650 milliGRAM(s) Oral every 6 hours  enoxaparin Injectable 40 milliGRAM(s) SubCutaneous daily  lactated ringers. 1000 milliLiter(s) (75 mL/Hr) IV Continuous <Continuous>  ondansetron Injectable 4 milliGRAM(s) IV Push every 6 hours PRN  piperacillin/tazobactam IVPB.. 3.375 Gram(s) IV Intermittent every 8 hours  senna 2 Tablet(s) Oral at bedtime      GEN:  AAOx3, cooperative, and appears to be in no acute distress.  HEAD: NC/AT  EYES: PERRL, EOMI.  CARDIAC: RRR, S1/S2. No S3, S4   LUNGS: CTAB  ABDOMEN: S, ND, NT, incisions c/d/i  MUSKULOSKELETAL: FROMx4 extremities, no back pain  NEUROLOGICAL: Grossly intact, no deficits  SKIN: Skin normal color, no rash    A/P yo F/M  POD#0 s/p laparoscopic appendectomy for perforated appendicitis  -Regular diet  -Continue antibiotics for max of 4 days or until WBC normalizes, which ever comes first  -Pain control  -OOB/Ambulate  -DVT ppx

## 2021-09-07 NOTE — H&P ADULT - ATTENDING COMMENTS
35 yo M with signs and symptoms consistent with acute appendicitis. CT scan A/P consistent with acute appendicitis with possible rupture.  AAOx3, NAD, RLQ pain  PUL CTA  CV RRR  GI Obese, RLQ tender, with local rebound and guarding  Plan  1.  Admit to surgery for lap Appy  2. Case d/w pat and pt opts forn surgery  3. NPO/IVF, IV Abx      code [id number]

## 2021-09-08 DIAGNOSIS — K35.80 UNSPECIFIED ACUTE APPENDICITIS: ICD-10-CM

## 2021-09-08 LAB
ALBUMIN SERPL ELPH-MCNC: 3.5 G/DL — SIGNIFICANT CHANGE UP (ref 3.3–5.2)
ALP SERPL-CCNC: 64 U/L — SIGNIFICANT CHANGE UP (ref 40–120)
ALT FLD-CCNC: 15 U/L — SIGNIFICANT CHANGE UP
ANION GAP SERPL CALC-SCNC: 14 MMOL/L — SIGNIFICANT CHANGE UP (ref 5–17)
APPEARANCE UR: CLEAR — SIGNIFICANT CHANGE UP
AST SERPL-CCNC: 20 U/L — SIGNIFICANT CHANGE UP
BASOPHILS # BLD AUTO: 0.03 K/UL — SIGNIFICANT CHANGE UP (ref 0–0.2)
BASOPHILS NFR BLD AUTO: 0.2 % — SIGNIFICANT CHANGE UP (ref 0–2)
BILIRUB SERPL-MCNC: 0.7 MG/DL — SIGNIFICANT CHANGE UP (ref 0.4–2)
BILIRUB UR-MCNC: NEGATIVE — SIGNIFICANT CHANGE UP
BUN SERPL-MCNC: 17.5 MG/DL — SIGNIFICANT CHANGE UP (ref 8–20)
CALCIUM SERPL-MCNC: 8.5 MG/DL — LOW (ref 8.6–10.2)
CHLORIDE SERPL-SCNC: 98 MMOL/L — SIGNIFICANT CHANGE UP (ref 98–107)
CO2 SERPL-SCNC: 26 MMOL/L — SIGNIFICANT CHANGE UP (ref 22–29)
COLOR SPEC: YELLOW — SIGNIFICANT CHANGE UP
COVID-19 SPIKE DOMAIN AB INTERP: POSITIVE
COVID-19 SPIKE DOMAIN ANTIBODY RESULT: >250 U/ML — HIGH
CREAT SERPL-MCNC: 0.98 MG/DL — SIGNIFICANT CHANGE UP (ref 0.5–1.3)
CULTURE RESULTS: NO GROWTH — SIGNIFICANT CHANGE UP
DIFF PNL FLD: ABNORMAL
EOSINOPHIL # BLD AUTO: 0 K/UL — SIGNIFICANT CHANGE UP (ref 0–0.5)
EOSINOPHIL NFR BLD AUTO: 0 % — SIGNIFICANT CHANGE UP (ref 0–6)
GLUCOSE SERPL-MCNC: 113 MG/DL — HIGH (ref 70–99)
GLUCOSE UR QL: NEGATIVE MG/DL — SIGNIFICANT CHANGE UP
HCT VFR BLD CALC: 37.8 % — LOW (ref 39–50)
HGB BLD-MCNC: 13.1 G/DL — SIGNIFICANT CHANGE UP (ref 13–17)
IMM GRANULOCYTES NFR BLD AUTO: 0.6 % — SIGNIFICANT CHANGE UP (ref 0–1.5)
KETONES UR-MCNC: NEGATIVE — SIGNIFICANT CHANGE UP
LEUKOCYTE ESTERASE UR-ACNC: NEGATIVE — SIGNIFICANT CHANGE UP
LYMPHOCYTES # BLD AUTO: 1.28 K/UL — SIGNIFICANT CHANGE UP (ref 1–3.3)
LYMPHOCYTES # BLD AUTO: 8.6 % — LOW (ref 13–44)
MAGNESIUM SERPL-MCNC: 2.6 MG/DL — SIGNIFICANT CHANGE UP (ref 1.6–2.6)
MCHC RBC-ENTMCNC: 31.2 PG — SIGNIFICANT CHANGE UP (ref 27–34)
MCHC RBC-ENTMCNC: 34.7 GM/DL — SIGNIFICANT CHANGE UP (ref 32–36)
MCV RBC AUTO: 90 FL — SIGNIFICANT CHANGE UP (ref 80–100)
MONOCYTES # BLD AUTO: 1.13 K/UL — HIGH (ref 0–0.9)
MONOCYTES NFR BLD AUTO: 7.6 % — SIGNIFICANT CHANGE UP (ref 2–14)
NEUTROPHILS # BLD AUTO: 12.31 K/UL — HIGH (ref 1.8–7.4)
NEUTROPHILS NFR BLD AUTO: 83 % — HIGH (ref 43–77)
NITRITE UR-MCNC: NEGATIVE — SIGNIFICANT CHANGE UP
PH UR: 6 — SIGNIFICANT CHANGE UP (ref 5–8)
PHOSPHATE SERPL-MCNC: 3 MG/DL — SIGNIFICANT CHANGE UP (ref 2.4–4.7)
PLATELET # BLD AUTO: 195 K/UL — SIGNIFICANT CHANGE UP (ref 150–400)
POTASSIUM SERPL-MCNC: 3.5 MMOL/L — SIGNIFICANT CHANGE UP (ref 3.5–5.3)
POTASSIUM SERPL-SCNC: 3.5 MMOL/L — SIGNIFICANT CHANGE UP (ref 3.5–5.3)
PROT SERPL-MCNC: 7.1 G/DL — SIGNIFICANT CHANGE UP (ref 6.6–8.7)
PROT UR-MCNC: 15 MG/DL
RBC # BLD: 4.2 M/UL — SIGNIFICANT CHANGE UP (ref 4.2–5.8)
RBC # FLD: 13 % — SIGNIFICANT CHANGE UP (ref 10.3–14.5)
RBC CASTS # UR COMP ASSIST: SIGNIFICANT CHANGE UP /HPF (ref 0–4)
SARS-COV-2 IGG+IGM SERPL QL IA: >250 U/ML — HIGH
SARS-COV-2 IGG+IGM SERPL QL IA: POSITIVE
SODIUM SERPL-SCNC: 138 MMOL/L — SIGNIFICANT CHANGE UP (ref 135–145)
SP GR SPEC: 1.01 — SIGNIFICANT CHANGE UP (ref 1.01–1.02)
SPECIMEN SOURCE: SIGNIFICANT CHANGE UP
UROBILINOGEN FLD QL: NEGATIVE MG/DL — SIGNIFICANT CHANGE UP
WBC # BLD: 14.84 K/UL — HIGH (ref 3.8–10.5)
WBC # FLD AUTO: 14.84 K/UL — HIGH (ref 3.8–10.5)
WBC UR QL: NEGATIVE — SIGNIFICANT CHANGE UP

## 2021-09-08 PROCEDURE — 99024 POSTOP FOLLOW-UP VISIT: CPT

## 2021-09-08 RX ORDER — POTASSIUM CHLORIDE 20 MEQ
40 PACKET (EA) ORAL EVERY 4 HOURS
Refills: 0 | Status: COMPLETED | OUTPATIENT
Start: 2021-09-08 | End: 2021-09-08

## 2021-09-08 RX ORDER — POLYETHYLENE GLYCOL 3350 17 G/17G
17 POWDER, FOR SOLUTION ORAL
Refills: 0 | Status: DISCONTINUED | OUTPATIENT
Start: 2021-09-08 | End: 2021-09-11

## 2021-09-08 RX ADMIN — SENNA PLUS 2 TABLET(S): 8.6 TABLET ORAL at 22:02

## 2021-09-08 RX ADMIN — POLYETHYLENE GLYCOL 3350 17 GRAM(S): 17 POWDER, FOR SOLUTION ORAL at 12:30

## 2021-09-08 RX ADMIN — Medication 650 MILLIGRAM(S): at 00:08

## 2021-09-08 RX ADMIN — Medication 650 MILLIGRAM(S): at 06:01

## 2021-09-08 RX ADMIN — Medication 650 MILLIGRAM(S): at 05:34

## 2021-09-08 RX ADMIN — Medication 650 MILLIGRAM(S): at 22:02

## 2021-09-08 RX ADMIN — PIPERACILLIN AND TAZOBACTAM 25 GRAM(S): 4; .5 INJECTION, POWDER, LYOPHILIZED, FOR SOLUTION INTRAVENOUS at 01:26

## 2021-09-08 RX ADMIN — Medication 650 MILLIGRAM(S): at 22:47

## 2021-09-08 RX ADMIN — PIPERACILLIN AND TAZOBACTAM 25 GRAM(S): 4; .5 INJECTION, POWDER, LYOPHILIZED, FOR SOLUTION INTRAVENOUS at 15:49

## 2021-09-08 RX ADMIN — Medication 40 MILLIEQUIVALENT(S): at 15:50

## 2021-09-08 RX ADMIN — ENOXAPARIN SODIUM 40 MILLIGRAM(S): 100 INJECTION SUBCUTANEOUS at 12:30

## 2021-09-08 RX ADMIN — PIPERACILLIN AND TAZOBACTAM 25 GRAM(S): 4; .5 INJECTION, POWDER, LYOPHILIZED, FOR SOLUTION INTRAVENOUS at 12:30

## 2021-09-08 RX ADMIN — Medication 40 MILLIEQUIVALENT(S): at 15:49

## 2021-09-08 RX ADMIN — Medication 650 MILLIGRAM(S): at 12:31

## 2021-09-09 LAB
ANION GAP SERPL CALC-SCNC: 13 MMOL/L — SIGNIFICANT CHANGE UP (ref 5–17)
BASOPHILS # BLD AUTO: 0.03 K/UL — SIGNIFICANT CHANGE UP (ref 0–0.2)
BASOPHILS NFR BLD AUTO: 0.2 % — SIGNIFICANT CHANGE UP (ref 0–2)
BUN SERPL-MCNC: 14 MG/DL — SIGNIFICANT CHANGE UP (ref 8–20)
CALCIUM SERPL-MCNC: 8.9 MG/DL — SIGNIFICANT CHANGE UP (ref 8.6–10.2)
CHLORIDE SERPL-SCNC: 100 MMOL/L — SIGNIFICANT CHANGE UP (ref 98–107)
CO2 SERPL-SCNC: 26 MMOL/L — SIGNIFICANT CHANGE UP (ref 22–29)
CREAT SERPL-MCNC: 0.94 MG/DL — SIGNIFICANT CHANGE UP (ref 0.5–1.3)
EOSINOPHIL # BLD AUTO: 0.08 K/UL — SIGNIFICANT CHANGE UP (ref 0–0.5)
EOSINOPHIL NFR BLD AUTO: 0.6 % — SIGNIFICANT CHANGE UP (ref 0–6)
GLUCOSE SERPL-MCNC: 96 MG/DL — SIGNIFICANT CHANGE UP (ref 70–99)
HCT VFR BLD CALC: 37 % — LOW (ref 39–50)
HGB BLD-MCNC: 12.9 G/DL — LOW (ref 13–17)
IMM GRANULOCYTES NFR BLD AUTO: 0.4 % — SIGNIFICANT CHANGE UP (ref 0–1.5)
LYMPHOCYTES # BLD AUTO: 1.33 K/UL — SIGNIFICANT CHANGE UP (ref 1–3.3)
LYMPHOCYTES # BLD AUTO: 10.5 % — LOW (ref 13–44)
MAGNESIUM SERPL-MCNC: 2.5 MG/DL — SIGNIFICANT CHANGE UP (ref 1.8–2.6)
MCHC RBC-ENTMCNC: 31.5 PG — SIGNIFICANT CHANGE UP (ref 27–34)
MCHC RBC-ENTMCNC: 34.9 GM/DL — SIGNIFICANT CHANGE UP (ref 32–36)
MCV RBC AUTO: 90.2 FL — SIGNIFICANT CHANGE UP (ref 80–100)
MONOCYTES # BLD AUTO: 1.2 K/UL — HIGH (ref 0–0.9)
MONOCYTES NFR BLD AUTO: 9.4 % — SIGNIFICANT CHANGE UP (ref 2–14)
NEUTROPHILS # BLD AUTO: 10.01 K/UL — HIGH (ref 1.8–7.4)
NEUTROPHILS NFR BLD AUTO: 78.9 % — HIGH (ref 43–77)
PHOSPHATE SERPL-MCNC: 3.4 MG/DL — SIGNIFICANT CHANGE UP (ref 2.4–4.7)
PLATELET # BLD AUTO: 230 K/UL — SIGNIFICANT CHANGE UP (ref 150–400)
POTASSIUM SERPL-MCNC: 3.8 MMOL/L — SIGNIFICANT CHANGE UP (ref 3.5–5.3)
POTASSIUM SERPL-SCNC: 3.8 MMOL/L — SIGNIFICANT CHANGE UP (ref 3.5–5.3)
RBC # BLD: 4.1 M/UL — LOW (ref 4.2–5.8)
RBC # FLD: 13.1 % — SIGNIFICANT CHANGE UP (ref 10.3–14.5)
SODIUM SERPL-SCNC: 139 MMOL/L — SIGNIFICANT CHANGE UP (ref 135–145)
SURGICAL PATHOLOGY STUDY: SIGNIFICANT CHANGE UP
WBC # BLD: 12.7 K/UL — HIGH (ref 3.8–10.5)
WBC # FLD AUTO: 12.7 K/UL — HIGH (ref 3.8–10.5)

## 2021-09-09 PROCEDURE — 99231 SBSQ HOSP IP/OBS SF/LOW 25: CPT | Mod: GC

## 2021-09-09 RX ORDER — POTASSIUM CHLORIDE 20 MEQ
20 PACKET (EA) ORAL ONCE
Refills: 0 | Status: COMPLETED | OUTPATIENT
Start: 2021-09-09 | End: 2021-09-09

## 2021-09-09 RX ORDER — OXYCODONE HYDROCHLORIDE 5 MG/1
10 TABLET ORAL ONCE
Refills: 0 | Status: DISCONTINUED | OUTPATIENT
Start: 2021-09-09 | End: 2021-09-09

## 2021-09-09 RX ORDER — KETOROLAC TROMETHAMINE 30 MG/ML
15 SYRINGE (ML) INJECTION ONCE
Refills: 0 | Status: DISCONTINUED | OUTPATIENT
Start: 2021-09-09 | End: 2021-09-09

## 2021-09-09 RX ADMIN — POLYETHYLENE GLYCOL 3350 17 GRAM(S): 17 POWDER, FOR SOLUTION ORAL at 17:42

## 2021-09-09 RX ADMIN — Medication 20 MILLIEQUIVALENT(S): at 17:41

## 2021-09-09 RX ADMIN — OXYCODONE HYDROCHLORIDE 10 MILLIGRAM(S): 5 TABLET ORAL at 18:00

## 2021-09-09 RX ADMIN — Medication 650 MILLIGRAM(S): at 11:28

## 2021-09-09 RX ADMIN — OXYCODONE HYDROCHLORIDE 10 MILLIGRAM(S): 5 TABLET ORAL at 17:41

## 2021-09-09 RX ADMIN — PIPERACILLIN AND TAZOBACTAM 25 GRAM(S): 4; .5 INJECTION, POWDER, LYOPHILIZED, FOR SOLUTION INTRAVENOUS at 17:42

## 2021-09-09 RX ADMIN — ENOXAPARIN SODIUM 40 MILLIGRAM(S): 100 INJECTION SUBCUTANEOUS at 11:31

## 2021-09-09 RX ADMIN — Medication 650 MILLIGRAM(S): at 05:15

## 2021-09-09 RX ADMIN — Medication 650 MILLIGRAM(S): at 06:00

## 2021-09-09 RX ADMIN — PIPERACILLIN AND TAZOBACTAM 25 GRAM(S): 4; .5 INJECTION, POWDER, LYOPHILIZED, FOR SOLUTION INTRAVENOUS at 01:11

## 2021-09-09 RX ADMIN — Medication 15 MILLIGRAM(S): at 01:32

## 2021-09-09 RX ADMIN — SENNA PLUS 2 TABLET(S): 8.6 TABLET ORAL at 22:04

## 2021-09-09 RX ADMIN — Medication 650 MILLIGRAM(S): at 17:42

## 2021-09-09 RX ADMIN — PIPERACILLIN AND TAZOBACTAM 25 GRAM(S): 4; .5 INJECTION, POWDER, LYOPHILIZED, FOR SOLUTION INTRAVENOUS at 09:27

## 2021-09-09 RX ADMIN — Medication 15 MILLIGRAM(S): at 01:47

## 2021-09-09 NOTE — PROGRESS NOTE ADULT - PROBLEM SELECTOR PLAN 1
serial abdominal exams  iv abx x 4 days  pain control  regular diet  dvt ppx   encourage ambulation  monitor leukocytosis... resolving
serial abdominal exams  iv abx x 4 days  pain control  regular diet  dvt ppx   encourage ambulation  monitor leukocytosis

## 2021-09-10 LAB
BASOPHILS # BLD AUTO: 0.05 K/UL — SIGNIFICANT CHANGE UP (ref 0–0.2)
BASOPHILS NFR BLD AUTO: 0.4 % — SIGNIFICANT CHANGE UP (ref 0–2)
EOSINOPHIL # BLD AUTO: 0.31 K/UL — SIGNIFICANT CHANGE UP (ref 0–0.5)
EOSINOPHIL NFR BLD AUTO: 2.6 % — SIGNIFICANT CHANGE UP (ref 0–6)
HCT VFR BLD CALC: 40.2 % — SIGNIFICANT CHANGE UP (ref 39–50)
HGB BLD-MCNC: 13.6 G/DL — SIGNIFICANT CHANGE UP (ref 13–17)
IMM GRANULOCYTES NFR BLD AUTO: 0.6 % — SIGNIFICANT CHANGE UP (ref 0–1.5)
LYMPHOCYTES # BLD AUTO: 1.49 K/UL — SIGNIFICANT CHANGE UP (ref 1–3.3)
LYMPHOCYTES # BLD AUTO: 12.3 % — LOW (ref 13–44)
MCHC RBC-ENTMCNC: 30.8 PG — SIGNIFICANT CHANGE UP (ref 27–34)
MCHC RBC-ENTMCNC: 33.8 GM/DL — SIGNIFICANT CHANGE UP (ref 32–36)
MCV RBC AUTO: 91.2 FL — SIGNIFICANT CHANGE UP (ref 80–100)
MONOCYTES # BLD AUTO: 1.41 K/UL — HIGH (ref 0–0.9)
MONOCYTES NFR BLD AUTO: 11.7 % — SIGNIFICANT CHANGE UP (ref 2–14)
NEUTROPHILS # BLD AUTO: 8.77 K/UL — HIGH (ref 1.8–7.4)
NEUTROPHILS NFR BLD AUTO: 72.4 % — SIGNIFICANT CHANGE UP (ref 43–77)
PLATELET # BLD AUTO: 242 K/UL — SIGNIFICANT CHANGE UP (ref 150–400)
RBC # BLD: 4.41 M/UL — SIGNIFICANT CHANGE UP (ref 4.2–5.8)
RBC # FLD: 13 % — SIGNIFICANT CHANGE UP (ref 10.3–14.5)
WBC # BLD: 12.1 K/UL — HIGH (ref 3.8–10.5)
WBC # FLD AUTO: 12.1 K/UL — HIGH (ref 3.8–10.5)

## 2021-09-10 PROCEDURE — 99024 POSTOP FOLLOW-UP VISIT: CPT | Mod: GC

## 2021-09-10 PROCEDURE — 99231 SBSQ HOSP IP/OBS SF/LOW 25: CPT | Mod: GC

## 2021-09-10 RX ORDER — IBUPROFEN 200 MG
400 TABLET ORAL THREE TIMES A DAY
Refills: 0 | Status: DISCONTINUED | OUTPATIENT
Start: 2021-09-10 | End: 2021-09-11

## 2021-09-10 RX ORDER — KETOROLAC TROMETHAMINE 30 MG/ML
10 SYRINGE (ML) INJECTION ONCE
Refills: 0 | Status: DISCONTINUED | OUTPATIENT
Start: 2021-09-10 | End: 2021-09-10

## 2021-09-10 RX ADMIN — Medication 10 MILLIGRAM(S): at 12:08

## 2021-09-10 RX ADMIN — Medication 650 MILLIGRAM(S): at 01:15

## 2021-09-10 RX ADMIN — Medication 650 MILLIGRAM(S): at 12:08

## 2021-09-10 RX ADMIN — PIPERACILLIN AND TAZOBACTAM 25 GRAM(S): 4; .5 INJECTION, POWDER, LYOPHILIZED, FOR SOLUTION INTRAVENOUS at 22:28

## 2021-09-10 RX ADMIN — Medication 650 MILLIGRAM(S): at 17:21

## 2021-09-10 RX ADMIN — SENNA PLUS 2 TABLET(S): 8.6 TABLET ORAL at 22:28

## 2021-09-10 RX ADMIN — PIPERACILLIN AND TAZOBACTAM 25 GRAM(S): 4; .5 INJECTION, POWDER, LYOPHILIZED, FOR SOLUTION INTRAVENOUS at 17:21

## 2021-09-10 RX ADMIN — ENOXAPARIN SODIUM 40 MILLIGRAM(S): 100 INJECTION SUBCUTANEOUS at 11:08

## 2021-09-10 RX ADMIN — Medication 400 MILLIGRAM(S): at 12:08

## 2021-09-10 RX ADMIN — Medication 650 MILLIGRAM(S): at 05:46

## 2021-09-10 RX ADMIN — Medication 650 MILLIGRAM(S): at 22:28

## 2021-09-10 RX ADMIN — Medication 400 MILLIGRAM(S): at 11:08

## 2021-09-10 RX ADMIN — PIPERACILLIN AND TAZOBACTAM 25 GRAM(S): 4; .5 INJECTION, POWDER, LYOPHILIZED, FOR SOLUTION INTRAVENOUS at 00:19

## 2021-09-10 RX ADMIN — Medication 650 MILLIGRAM(S): at 00:19

## 2021-09-10 RX ADMIN — Medication 10 MILLIGRAM(S): at 11:28

## 2021-09-10 RX ADMIN — PIPERACILLIN AND TAZOBACTAM 25 GRAM(S): 4; .5 INJECTION, POWDER, LYOPHILIZED, FOR SOLUTION INTRAVENOUS at 10:05

## 2021-09-10 RX ADMIN — Medication 650 MILLIGRAM(S): at 22:32

## 2021-09-10 RX ADMIN — POLYETHYLENE GLYCOL 3350 17 GRAM(S): 17 POWDER, FOR SOLUTION ORAL at 05:46

## 2021-09-10 RX ADMIN — Medication 650 MILLIGRAM(S): at 11:08

## 2021-09-10 NOTE — PROGRESS NOTE ADULT - ATTENDING COMMENTS
s/p lap appy  cont to monitor WBC  AM labs  ishmael po
I have seen and examined the patient during rounds fro 730-8a  pain under control'  tolerating diet  cont Zosyn until WBC normal or for a total of 4 days
cont to monitor WBC  IV Zosyn  ishmael po  Po ABX when CX finalized

## 2021-09-10 NOTE — PROGRESS NOTE ADULT - ASSESSMENT
37 yo M presenting with perforated appendicitis now POD# 3 s/p lap appendectomy.     -monitor leukocytosis, resolving  -pain control  -regular diet  -OOB, ambulating  -IV abx

## 2021-09-11 ENCOUNTER — TRANSCRIPTION ENCOUNTER (OUTPATIENT)
Age: 36
End: 2021-09-11

## 2021-09-11 VITALS
OXYGEN SATURATION: 97 % | SYSTOLIC BLOOD PRESSURE: 149 MMHG | TEMPERATURE: 98 F | DIASTOLIC BLOOD PRESSURE: 76 MMHG | RESPIRATION RATE: 18 BRPM | HEART RATE: 72 BPM

## 2021-09-11 LAB
ANION GAP SERPL CALC-SCNC: 17 MMOL/L — SIGNIFICANT CHANGE UP (ref 5–17)
BUN SERPL-MCNC: 15.4 MG/DL — SIGNIFICANT CHANGE UP (ref 8–20)
CALCIUM SERPL-MCNC: 8.9 MG/DL — SIGNIFICANT CHANGE UP (ref 8.6–10.2)
CHLORIDE SERPL-SCNC: 98 MMOL/L — SIGNIFICANT CHANGE UP (ref 98–107)
CO2 SERPL-SCNC: 24 MMOL/L — SIGNIFICANT CHANGE UP (ref 22–29)
CREAT SERPL-MCNC: 0.87 MG/DL — SIGNIFICANT CHANGE UP (ref 0.5–1.3)
GLUCOSE SERPL-MCNC: 80 MG/DL — SIGNIFICANT CHANGE UP (ref 70–99)
HCT VFR BLD CALC: 37.9 % — LOW (ref 39–50)
HGB BLD-MCNC: 12.8 G/DL — LOW (ref 13–17)
MAGNESIUM SERPL-MCNC: 2.2 MG/DL — SIGNIFICANT CHANGE UP (ref 1.6–2.6)
MCHC RBC-ENTMCNC: 30.8 PG — SIGNIFICANT CHANGE UP (ref 27–34)
MCHC RBC-ENTMCNC: 33.8 GM/DL — SIGNIFICANT CHANGE UP (ref 32–36)
MCV RBC AUTO: 91.3 FL — SIGNIFICANT CHANGE UP (ref 80–100)
PHOSPHATE SERPL-MCNC: 3.6 MG/DL — SIGNIFICANT CHANGE UP (ref 2.4–4.7)
PLATELET # BLD AUTO: 271 K/UL — SIGNIFICANT CHANGE UP (ref 150–400)
POTASSIUM SERPL-MCNC: 4.6 MMOL/L — SIGNIFICANT CHANGE UP (ref 3.5–5.3)
POTASSIUM SERPL-SCNC: 4.6 MMOL/L — SIGNIFICANT CHANGE UP (ref 3.5–5.3)
RBC # BLD: 4.15 M/UL — LOW (ref 4.2–5.8)
RBC # FLD: 13.4 % — SIGNIFICANT CHANGE UP (ref 10.3–14.5)
SODIUM SERPL-SCNC: 139 MMOL/L — SIGNIFICANT CHANGE UP (ref 135–145)
WBC # BLD: 11.52 K/UL — HIGH (ref 3.8–10.5)
WBC # FLD AUTO: 11.52 K/UL — HIGH (ref 3.8–10.5)

## 2021-09-11 PROCEDURE — G1004: CPT

## 2021-09-11 PROCEDURE — 86769 SARS-COV-2 COVID-19 ANTIBODY: CPT

## 2021-09-11 PROCEDURE — 86850 RBC ANTIBODY SCREEN: CPT

## 2021-09-11 PROCEDURE — 80048 BASIC METABOLIC PNL TOTAL CA: CPT

## 2021-09-11 PROCEDURE — 96374 THER/PROPH/DIAG INJ IV PUSH: CPT

## 2021-09-11 PROCEDURE — 74176 CT ABD & PELVIS W/O CONTRAST: CPT | Mod: MG

## 2021-09-11 PROCEDURE — 86900 BLOOD TYPING SEROLOGIC ABO: CPT

## 2021-09-11 PROCEDURE — 85027 COMPLETE CBC AUTOMATED: CPT

## 2021-09-11 PROCEDURE — 83690 ASSAY OF LIPASE: CPT

## 2021-09-11 PROCEDURE — U0003: CPT

## 2021-09-11 PROCEDURE — 85025 COMPLETE CBC W/AUTO DIFF WBC: CPT

## 2021-09-11 PROCEDURE — 83735 ASSAY OF MAGNESIUM: CPT

## 2021-09-11 PROCEDURE — 81001 URINALYSIS AUTO W/SCOPE: CPT

## 2021-09-11 PROCEDURE — 96375 TX/PRO/DX INJ NEW DRUG ADDON: CPT

## 2021-09-11 PROCEDURE — 99285 EMERGENCY DEPT VISIT HI MDM: CPT

## 2021-09-11 PROCEDURE — 85610 PROTHROMBIN TIME: CPT

## 2021-09-11 PROCEDURE — 36415 COLL VENOUS BLD VENIPUNCTURE: CPT

## 2021-09-11 PROCEDURE — 84100 ASSAY OF PHOSPHORUS: CPT

## 2021-09-11 PROCEDURE — 88304 TISSUE EXAM BY PATHOLOGIST: CPT

## 2021-09-11 PROCEDURE — 87086 URINE CULTURE/COLONY COUNT: CPT

## 2021-09-11 PROCEDURE — C1889: CPT

## 2021-09-11 PROCEDURE — 80053 COMPREHEN METABOLIC PANEL: CPT

## 2021-09-11 PROCEDURE — 85730 THROMBOPLASTIN TIME PARTIAL: CPT

## 2021-09-11 PROCEDURE — 99024 POSTOP FOLLOW-UP VISIT: CPT

## 2021-09-11 PROCEDURE — U0005: CPT

## 2021-09-11 PROCEDURE — 86901 BLOOD TYPING SEROLOGIC RH(D): CPT

## 2021-09-11 RX ORDER — IBUPROFEN 200 MG
1 TABLET ORAL
Qty: 0 | Refills: 0 | DISCHARGE

## 2021-09-11 RX ORDER — IBUPROFEN 200 MG
1 TABLET ORAL
Qty: 20 | Refills: 0
Start: 2021-09-11 | End: 2021-09-15

## 2021-09-11 RX ORDER — ACETAMINOPHEN 500 MG
2 TABLET ORAL
Qty: 30 | Refills: 0
Start: 2021-09-11 | End: 2021-09-15

## 2021-09-11 RX ORDER — ACETAMINOPHEN 500 MG
2 TABLET ORAL
Qty: 0 | Refills: 0 | DISCHARGE

## 2021-09-11 RX ADMIN — Medication 650 MILLIGRAM(S): at 10:36

## 2021-09-11 RX ADMIN — Medication 650 MILLIGRAM(S): at 11:30

## 2021-09-11 RX ADMIN — Medication 650 MILLIGRAM(S): at 06:39

## 2021-09-11 RX ADMIN — POLYETHYLENE GLYCOL 3350 17 GRAM(S): 17 POWDER, FOR SOLUTION ORAL at 05:33

## 2021-09-11 RX ADMIN — PIPERACILLIN AND TAZOBACTAM 25 GRAM(S): 4; .5 INJECTION, POWDER, LYOPHILIZED, FOR SOLUTION INTRAVENOUS at 10:36

## 2021-09-11 RX ADMIN — Medication 650 MILLIGRAM(S): at 05:33

## 2021-09-11 RX ADMIN — ENOXAPARIN SODIUM 40 MILLIGRAM(S): 100 INJECTION SUBCUTANEOUS at 10:36

## 2021-09-11 NOTE — PROGRESS NOTE ADULT - SUBJECTIVE AND OBJECTIVE BOX
SUBJECTIVE/24 hour events:  Patient is a 36yMale presenting with perforated appendicitis now pod#1 lap appendectomy. Patient with no acute events overnight, pain is controlled on current regimen, tolerating a diet, voiding spontaneously, ambulating with a steady gait, remains on IV abx, leukocytosis resolving. Patient with no requests or complaints at this time.       Vital Signs Last 24 Hrs  T(C): 36.9 (08 Sep 2021 04:16), Max: 37.2 (07 Sep 2021 08:29)  T(F): 98.5 (08 Sep 2021 04:16), Max: 99 (07 Sep 2021 08:29)  HR: 80 (08 Sep 2021 04:16) (77 - 95)  BP: 116/72 (08 Sep 2021 04:16) (102/62 - 132/92)  BP(mean): --  RR: 18 (08 Sep 2021 04:16) (12 - 20)  SpO2: 94% (08 Sep 2021 04:16) (93% - 100%)  Drug Dosing Weight  Height (cm): 175.3 (07 Sep 2021 08:29)  Weight (kg): 104.3 (07 Sep 2021 08:29)  BMI (kg/m2): 33.9 (07 Sep 2021 08:29)  BSA (m2): 2.19 (07 Sep 2021 08:29)  I&O's Detail    07 Sep 2021 07:01  -  08 Sep 2021 04:24  --------------------------------------------------------  IN:  Total IN: 0 mL    OUT:    Voided (mL): 530 mL  Total OUT: 530 mL    Total NET: -530 mL        Allergies    latex (Unknown)  No Known Drug Allergies    Intolerances                              14.9   17.44 )-----------( 236      ( 06 Sep 2021 23:14 )             43.3   09-06    139  |  95<L>  |  17.8  ----------------------------<  123<H>  4.0   |  26.0  |  1.21    Ca    9.6      06 Sep 2021 23:14    TPro  7.8  /  Alb  4.5  /  TBili  0.8  /  DBili  x   /  AST  19  /  ALT  16  /  AlkPhos  61  09-06  PT/INR - ( 07 Sep 2021 05:51 )   PT: 12.6 sec;   INR: 1.09 ratio         PTT - ( 07 Sep 2021 05:51 )  PTT:27.8 sec    ROS:    PHYSICAL EXAM:  Constitutional: in good spirits     Respiratory: no respiratory distress, no dyspnea, no supplemental o2 needed     Gastrointestinal: abdomen softly distended, mild ttp, trochanter sites c/d/i, no guarding, no peritonitis     Genitourinary: voiding spontaneously     Neurological: A&Ox3    Skin: warm, dry and no rashes         MEDICATIONS  (STANDING):  acetaminophen   Tablet .. 650 milliGRAM(s) Oral every 6 hours  enoxaparin Injectable 40 milliGRAM(s) SubCutaneous daily  influenza   Vaccine 0.5 milliLiter(s) IntraMuscular once  lactated ringers. 1000 milliLiter(s) (75 mL/Hr) IV Continuous <Continuous>  piperacillin/tazobactam IVPB.. 3.375 Gram(s) IV Intermittent every 8 hours  senna 2 Tablet(s) Oral at bedtime    MEDICATIONS  (PRN):  ondansetron Injectable 4 milliGRAM(s) IV Push every 6 hours PRN Nausea      RADIOLOGY STUDIES:    CULTURES:        
ACS Surgery Progress Note     Subjective/24hour Events: No acute events overnight. Patient comfortable, afebrile, VSS. Had a fever of 100.8 today that is now resolved. Had 5/10 dull constant LLQ abdominal pain during the day that is now resolved after receiving his pain medications. No other complaints currently. Patient voiding, passing flatus, tolerating diet. No n/v, CP, SOB, dizziness, lightheadedness, fevers.     MEDICATIONS  (STANDING):  acetaminophen   Tablet .. 650 milliGRAM(s) Oral every 6 hours  enoxaparin Injectable 40 milliGRAM(s) SubCutaneous daily  influenza   Vaccine 0.5 milliLiter(s) IntraMuscular once  piperacillin/tazobactam IVPB.. 3.375 Gram(s) IV Intermittent every 8 hours  polyethylene glycol 3350 17 Gram(s) Oral two times a day  senna 2 Tablet(s) Oral at bedtime    MEDICATIONS  (PRN):  ondansetron Injectable 4 milliGRAM(s) IV Push every 6 hours PRN Nausea      Vital Signs:  Vital Signs Last 24 Hrs  T(C): 37.3 (09 Sep 2021 21:36), Max: 38.2 (09 Sep 2021 17:36)  T(F): 99.1 (09 Sep 2021 21:36), Max: 100.8 (09 Sep 2021 17:36)  HR: 78 (09 Sep 2021 21:36) (72 - 99)  BP: 119/81 (09 Sep 2021 21:36) (103/60 - 145/75)  BP(mean): --  RR: 18 (09 Sep 2021 21:36) (18 - 18)  SpO2: 95% (09 Sep 2021 21:36) (94% - 97%)    CAPILLARY BLOOD GLUCOSE          I&O's Detail    08 Sep 2021 07:01  -  09 Sep 2021 07:00  --------------------------------------------------------  IN:  Total IN: 0 mL    OUT:    Blood Loss (mL): 300 mL  Total OUT: 300 mL    Total NET: -300 mL      09 Sep 2021 07:01  -  10 Sep 2021 00:36  --------------------------------------------------------  IN:  Total IN: 0 mL    OUT:    Voided (mL): 400 mL  Total OUT: 400 mL    Total NET: -400 mL          Physical Exam:  General: NAD, Pleasant  Neurology: Patient is AA&Ox4, follows commands, and speech fluent. EOMI intact, PERRLA, 3mm--2mm.   Neck: Neck supple, trachea midline, No JVD  Respiratory: CTA B/L, (-)rales, rhonchi  CV: S1S2, r/r/r, (-)m/r/g  Abdomen: S/ND, BSx4. Mildly tender to palpation in the LLQ, no rebound guarding or peritoneal signs. Incision sites c/d/i  Extremities: 2+ peripheral pulses bilat throughout; (-)edema appreciated  Skin: No Rashes, Hematoma, Ecchymosis    Labs:    09-09    139  |  100  |  14.0  ----------------------------<  96  3.8   |  26.0  |  0.94    Ca    8.9      09 Sep 2021 05:36  Phos  3.4     09-09  Mg     2.5     09-09    TPro  7.1  /  Alb  3.5  /  TBili  0.7  /  DBili  x   /  AST  20  /  ALT  15  /  AlkPhos  64  09-08    LIVER FUNCTIONS - ( 08 Sep 2021 05:42 )  Alb: 3.5 g/dL / Pro: 7.1 g/dL / ALK PHOS: 64 U/L / ALT: 15 U/L / AST: 20 U/L / GGT: x                                 12.9   12.70 )-----------( 230      ( 09 Sep 2021 05:36 )             37.0         Imaging: reviewed                       
ACUTE CARE SURGERY PROGRESS NOTE    Subjective:   Patient examined at bedside this AM. Reports no acute complaints this morning. Tolerating PO without N/V.     Objective:  Vital Signs  T(C): 36.9 (09-11 @ 04:15), Max: 37 (09-10 @ 12:01)  HR: 75 (09-11 @ 04:15) (74 - 80)  BP: 122/72 (09-11 @ 04:15) (122/72 - 147/91)  RR: 18 (09-11 @ 04:15) (18 - 18)  SpO2: 94% (09-11 @ 04:15) (94% - 98%)    Physical Exam:  General: alert and oriented, NAD  Resp: airway patent, respirations unlabored  Abdomen: soft, nondistended, mild tenderness to palpation in LLQ    Labs:                        13.6   12.10 )-----------( 242      ( 10 Sep 2021 05:33 )             40.2         CAPILLARY BLOOD GLUCOSE          Medications:   MEDICATIONS  (STANDING):  acetaminophen   Tablet .. 650 milliGRAM(s) Oral every 6 hours  enoxaparin Injectable 40 milliGRAM(s) SubCutaneous daily  influenza   Vaccine 0.5 milliLiter(s) IntraMuscular once  piperacillin/tazobactam IVPB.. 3.375 Gram(s) IV Intermittent every 8 hours  polyethylene glycol 3350 17 Gram(s) Oral two times a day  senna 2 Tablet(s) Oral at bedtime    MEDICATIONS  (PRN):  ibuprofen  Tablet. 400 milliGRAM(s) Oral three times a day PRN Temp greater or equal to 38C (100.4F), Moderate Pain (4 - 6)  ondansetron Injectable 4 milliGRAM(s) IV Push every 6 hours PRN Nausea      
SUBJECTIVE/24 hour events:   Patient is a 36yMale presenting with perforated appendicitis now pod#2 lap appendectomy. Patient with no acute events overnight, pain is controlled on current regimen, tolerating a diet, voiding spontaneously, ambulating with a steady gait, remains on IV abx, leukocytosis resolving. Patient with no requests or complaints at this time.       Vital Signs Last 24 Hrs  T(C): 36.9 (08 Sep 2021 22:40), Max: 37.6 (08 Sep 2021 17:35)  T(F): 98.5 (08 Sep 2021 22:40), Max: 99.7 (08 Sep 2021 17:35)  HR: 94 (08 Sep 2021 22:40) (80 - 94)  BP: 113/72 (08 Sep 2021 22:40) (105/65 - 125/73)  BP(mean): --  RR: 18 (08 Sep 2021 22:40) (18 - 18)  SpO2: 94% (08 Sep 2021 22:40) (94% - 95%)  Drug Dosing Weight  Height (cm): 175.3 (07 Sep 2021 08:29)  Weight (kg): 104.3 (07 Sep 2021 08:29)  BMI (kg/m2): 33.9 (07 Sep 2021 08:29)  BSA (m2): 2.19 (07 Sep 2021 08:29)  I&O's Detail    07 Sep 2021 07:01  -  08 Sep 2021 07:00  --------------------------------------------------------  IN:  Total IN: 0 mL    OUT:    Voided (mL): 1330 mL  Total OUT: 1330 mL    Total NET: -1330 mL        Allergies    latex (Unknown)  No Known Drug Allergies    Intolerances                              13.1   14.84 )-----------( 195      ( 08 Sep 2021 05:42 )             37.8   09-08    138  |  98  |  17.5  ----------------------------<  113<H>  3.5   |  26.0  |  0.98    Ca    8.5<L>      08 Sep 2021 05:42  Phos  3.0     09-08  Mg     2.6     09-08    TPro  7.1  /  Alb  3.5  /  TBili  0.7  /  DBili  x   /  AST  20  /  ALT  15  /  AlkPhos  64  09-08  PT/INR - ( 07 Sep 2021 05:51 )   PT: 12.6 sec;   INR: 1.09 ratio         PTT - ( 07 Sep 2021 05:51 )  PTT:27.8 sec    ROS:    PHYSICAL EXAM:  Constitutional: in good spirits     Respiratory: no respiratory distress, no dyspnea, no supplemental o2 needed     Gastrointestinal: abdomen softly distended, mild ttp, trochanter sites c/d/i, no guarding, no peritonitis     Genitourinary: voiding spontaneously     Neurological: A&Ox3    Skin: warm, dry and no rashes             MEDICATIONS  (STANDING):  acetaminophen   Tablet .. 650 milliGRAM(s) Oral every 6 hours  enoxaparin Injectable 40 milliGRAM(s) SubCutaneous daily  influenza   Vaccine 0.5 milliLiter(s) IntraMuscular once  piperacillin/tazobactam IVPB.. 3.375 Gram(s) IV Intermittent every 8 hours  polyethylene glycol 3350 17 Gram(s) Oral two times a day  senna 2 Tablet(s) Oral at bedtime    MEDICATIONS  (PRN):  ondansetron Injectable 4 milliGRAM(s) IV Push every 6 hours PRN Nausea      RADIOLOGY STUDIES:    CULTURES:

## 2021-09-11 NOTE — DISCHARGE NOTE NURSING/CASE MANAGEMENT/SOCIAL WORK - PATIENT PORTAL LINK FT
You can access the FollowMyHealth Patient Portal offered by [facility] by registering at the following website: [email or web address]. By joining [facility]’s FollowMyHealth portal, you will also be able to view your health information using other applications (apps) compatible with our system.

## 2021-09-11 NOTE — DISCHARGE NOTE NURSING/CASE MANAGEMENT/SOCIAL WORK - NSDCVIVACCINE_GEN_ALL_CORE_FT
Tdap; 09-Feb-2018 19:36; Erin Syed (RN); [employer]; [id number]; IntraMuscular; Deltoid Right.; 0.5 milliLiter(s); VIS (VIS Published: 09-May-2013, VIS Presented: 09-Feb-2018);   Tdap; 04-Jun-2020 21:39; Pinky Treviño (NIRANJAN); [employer]; [id number] (Exp. Date: 04-Apr-2022); IntraMuscular; Deltoid Right.; 0.5 milliLiter(s); VIS (VIS Published: 09-May-2013, VIS Presented: 04-Jun-2020);

## 2021-09-11 NOTE — PROGRESS NOTE ADULT - ASSESSMENT
Assessment:   35yo Male s/p lap appendectomy for perforated appendicitis    Plan:   - F/U AM labs  -continue zosyn  -Pain control PRN  -Diet: regular  - Dispo: pending improvement in wbc        Acute Care Surgery   Assessment:   35yo Male POD 4 s/p lap appendectomy for perforated appendicitis. Leukocytosis improving this morning.    Plan:   -continue zosyn  -Pain control PRN  -Diet: regular  - Dispo: possible dc today      Acute Care Surgery

## 2022-01-19 ENCOUNTER — EMERGENCY (EMERGENCY)
Facility: HOSPITAL | Age: 37
LOS: 1 days | Discharge: HOME | End: 2022-01-19
Attending: EMERGENCY MEDICINE
Payer: MEDICAID

## 2022-01-19 VITALS
HEART RATE: 101 BPM | WEIGHT: 220.02 LBS | OXYGEN SATURATION: 97 % | SYSTOLIC BLOOD PRESSURE: 130 MMHG | RESPIRATION RATE: 18 BRPM | DIASTOLIC BLOOD PRESSURE: 81 MMHG | HEIGHT: 69 IN | TEMPERATURE: 99 F

## 2022-01-19 PROCEDURE — 73610 X-RAY EXAM OF ANKLE: CPT | Mod: 26,50

## 2022-01-19 PROCEDURE — 73630 X-RAY EXAM OF FOOT: CPT | Mod: 26,LT

## 2022-01-19 PROCEDURE — 73610 X-RAY EXAM OF ANKLE: CPT

## 2022-01-19 PROCEDURE — 99284 EMERGENCY DEPT VISIT MOD MDM: CPT | Mod: 25

## 2022-01-19 PROCEDURE — 73630 X-RAY EXAM OF FOOT: CPT

## 2022-01-19 PROCEDURE — 99283 EMERGENCY DEPT VISIT LOW MDM: CPT

## 2022-01-19 RX ORDER — ACETAMINOPHEN 500 MG
650 TABLET ORAL ONCE
Refills: 0 | Status: COMPLETED | OUTPATIENT
Start: 2022-01-19 | End: 2022-01-19

## 2022-01-19 RX ADMIN — Medication 650 MILLIGRAM(S): at 08:46

## 2022-01-19 RX ADMIN — Medication 650 MILLIGRAM(S): at 09:46

## 2022-01-19 NOTE — ED PROVIDER NOTE - PATIENT PORTAL LINK FT
You can access the FollowMyHealth Patient Portal offered by [facility] by registering at the following website: [email or web address]. By joining [employer]’s FollowMyHealth portal, you will also be able to view your health information using other applications (apps) compatible with our system.

## 2022-01-19 NOTE — ED PROVIDER NOTE - CARE PROVIDER_API CALL
Dimas Faith)  Orthopaedic Surgery  [address] [place] [address]  Phone: [phone]  Fax: [phone]  Follow Up Time:

## 2022-01-19 NOTE — ED ADULT TRIAGE NOTE - CHIEF COMPLAINT QUOTE
bilateral ankle pain, left ankle edema s/p "slipped down stairs yesterday and twisted both ankles". denies LOC/head trauma. + pulses

## 2022-01-19 NOTE — ED PROVIDER NOTE - ATTENDING CONTRIBUTION TO CARE
35 y/o male presenting to the ed for bilateral ankle pain  after tripping;  no other injury; pe awake alert in nad heent ncat neck supple cor s1s2 lungs clear abd soft nontender  b/l ankles- tender to palp over malleoli;    dx ankle sprain

## 2022-01-19 NOTE — ED PROVIDER NOTE - PHYSICAL EXAMINATION
Const: Awake, alert and oriented. In no acute distress. Well appearing.  HEENT: NC/AT. Moist mucous membranes.  Eyes: No scleral icterus. EOMI.  Neck:. Soft and supple. Full ROM without pain.  Cardiac:  +S1/S2. No murmurs. Peripheral pulses 2+ and symmetric. No LE edema.  Resp: Speaking in full sentences. No evidence of respiratory distress. No wheezes, rales or rhonchi.  Abd: Soft, non-tender, non-distended. Normal bowel sounds in all 4 quadrants. No guarding or rebound.  Back: Spine midline and non-tender. No CVAT.  MSK: Tenderness over bilaterally ankles, FROM in all extremities neurovasculary intact DP palpable   Skin: swelling over left ankle noted, no abrasions or erythema noted   Lymph: No cervical lymphadenopathy.  Neuro: Awake, alert & oriented x 3. Moves all extremities symmetrically.

## 2022-01-19 NOTE — ED PROVIDER NOTE - OBJECTIVE STATEMENT
General pt is a 35 y/o male presenting to the ed for bilateral ankle pain pt states was walking yesterday when tripped and rolled both ankles. pt denies head injury or LOC. pt states pain in both ankle left > right. pt denies cp sob fever abd pain nausea vomiting numbness or loss of sensation back pain urinary or fecal incontinence

## 2022-01-19 NOTE — ED PROCEDURE NOTE - ATTENDING CONTRIBUTION TO CARE
I personally saw the patient with the resident, and completed the key components of the history and physical exam. I then discussed the management plan with the resident. I personally saw the patient with the resident, and completed the key components of the history and physical exam. I then discussed the management plan with the acp.

## 2023-02-22 NOTE — ED PROVIDER NOTE - PHYSICAL EXAMINATION
I have personally evaluated and examined the patient. The Attending was available to me as a supervising provider if needed.
Vitals: Noted, see flow sheet.  General: Well appearing, in no acute distress, non-toxic appearing. Ambulatory without assistance, steady gait.  HEENT:  NC/AT. EOMI, PERRL, no nystagmus, no scleral icterus, no raccoon eyes, no carolina sign.  No otorrhea, no hemotympanum. No epistaxis, no septal hematoma, no rhinorrhea.   Neck: Soft and supple, no midline bony tenderness to palpation, FROM without pain.   Cardiac: Regular rate and rhythm, +S1/S2.  Peripheral pulses 2+ and symmetric b/l.    Chest/Lungs: No evidence of respiratory distress, speaking in full sentences.  Chest wall stable and non-tender to palpation without ecchymosis.  No flail chest, no crepitus. Expansion symmetrical, lungs clear to ascultation b/l, no wheezes/rhonchi/rales/stridor.  Abdomen: Soft, non-tender, non-distended, no guarding, no ecchymosis or external signs of abdominal trauma. Pelvis stable.  Extremities: Moves spontaneously and symmetrically.  Neuro: Awake, alert and oriented to person/place/time/situation.  GCS 15.  Speech clear, no focal deficits, no facial droop  Follows commands appropriately.  Sensation intact b/l no deficits,  strong and equal.  Strength 5/5 symmetrical upper/lower extremities.  CN II-XII intact. Normal finger to nose, normal heel to shin, normal rapid alternating movements. No ataxic gait, negative romberg, no pronator drift.   RIGHT Shoulder: No apparent deformity or dislocation. No edema, erythema, ecchymosis, abrasion/laceration. TTP of trapezius muscle, no other ttp, no bony ttp. +FROM on active/passive. No sensory or motor deficits.  strength intact. Ulnar/median/radial nerves intact. 2+ radial pulse. No scapula ttp. No crepitus.

## 2023-07-31 ENCOUNTER — EMERGENCY (EMERGENCY)
Facility: HOSPITAL | Age: 38
LOS: 1 days | Discharge: HOME | End: 2023-07-31
Attending: STUDENT IN AN ORGANIZED HEALTH CARE EDUCATION/TRAINING PROGRAM
Payer: SELF-PAY

## 2023-07-31 VITALS
WEIGHT: 205.03 LBS | OXYGEN SATURATION: 95 % | HEART RATE: 90 BPM | SYSTOLIC BLOOD PRESSURE: 157 MMHG | TEMPERATURE: 99 F | DIASTOLIC BLOOD PRESSURE: 68 MMHG | RESPIRATION RATE: 17 BRPM

## 2023-07-31 PROCEDURE — 73552 X-RAY EXAM OF FEMUR 2/>: CPT | Mod: 26,LT

## 2023-07-31 PROCEDURE — 73590 X-RAY EXAM OF LOWER LEG: CPT

## 2023-07-31 PROCEDURE — 73502 X-RAY EXAM HIP UNI 2-3 VIEWS: CPT

## 2023-07-31 PROCEDURE — 99284 EMERGENCY DEPT VISIT MOD MDM: CPT

## 2023-07-31 PROCEDURE — 73590 X-RAY EXAM OF LOWER LEG: CPT | Mod: 26,RT

## 2023-07-31 PROCEDURE — 73502 X-RAY EXAM HIP UNI 2-3 VIEWS: CPT | Mod: 26,LT

## 2023-07-31 PROCEDURE — 73552 X-RAY EXAM OF FEMUR 2/>: CPT

## 2023-07-31 PROCEDURE — 71045 X-RAY EXAM CHEST 1 VIEW: CPT | Mod: 26

## 2023-07-31 PROCEDURE — 99284 EMERGENCY DEPT VISIT MOD MDM: CPT | Mod: 25

## 2023-07-31 PROCEDURE — 71045 X-RAY EXAM CHEST 1 VIEW: CPT

## 2023-07-31 RX ORDER — ACETAMINOPHEN 500 MG
975 TABLET ORAL ONCE
Refills: 0 | Status: COMPLETED | OUTPATIENT
Start: 2023-07-31 | End: 2023-07-31

## 2023-07-31 RX ADMIN — Medication 975 MILLIGRAM(S): at 18:13

## 2023-07-31 NOTE — ED ADULT NURSE NOTE - NSFALLUNIVINTERV_ED_ALL_ED
Bed/Stretcher in lowest position, wheels locked, appropriate side rails in place/Call bell, personal items and telephone in reach/Instruct patient to call for assistance before getting out of bed/chair/stretcher/Non-slip footwear applied when patient is off stretcher/[place] to call system/Physically safe environment - no spills, clutter or unnecessary equipment/Purposeful proactive rounding/Room/bathroom lighting operational, light cord in reach

## 2023-07-31 NOTE — ED PROVIDER NOTE - PATIENT PORTAL LINK FT
You can access the FollowMyHealth Patient Portal offered by [facility] by registering at the following website: [email or web address]. By joining [employer]’s FollowMyHealth portal, you will also be able to view your health information using other applications (apps) compatible with our system.

## 2023-07-31 NOTE — ED PROVIDER NOTE - CLINICAL SUMMARY MEDICAL DECISION MAKING FREE TEXT BOX
XR negative.  Pt with hip contusion. abdomen benign. no chest wall ttp.  Pt reassured and instructed to f/up with pcp. instructed to return for any new/concerning symptoms.  Pt given a copy of all results and instructed to f/up with pcp regarding any abnormal results.
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
## 2023-07-31 NOTE — ED PROVIDER NOTE - PHYSICAL EXAMINATION
Constitutional - well-developed.   Head - NCAT. Airway patent.   Eyes - PERRL.   CV - RRR. no murmur. no edema.   Pulm - CTAB.   Abd - soft, nt. no rebound. no guarding.   Neuro - A&Ox3. strength 5/5 x4. sensation intact x4. antalgic gait gait.   Skin - No rash. .  MSK - normal ROM. pain on ttp over L ASIS and L greater troch. normal ROM.

## 2023-07-31 NOTE — ED PROVIDER NOTE - NSFOLLOWUPINSTRUCTIONS_ED_ALL_ED_FT
Need for prophylactic measure
Need for prophylactic measure
Contusion    A contusion is a deep bruise. Contusions are the result of a blunt injury to tissues and muscle fibers under the skin. The skin overlying the contusion may turn blue, purple, or yellow. Symptoms also include pain and swelling in the injured area.    SEEK IMMEDIATE MEDICAL CARE IF YOU HAVE ANY OF THE FOLLOWING SYMPTOMS: severe pain, numbness, tingling, pain, weakness, or skin color/temperature change in any part of your body distal to the injury.
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
## 2023-09-13 NOTE — ED PROVIDER NOTE - PRINCIPAL DIAGNOSIS
Airway    Performed by: Damir Thomson MD  Authorized by: Damir Thomson MD    Final Airway Type:  Endotracheal airway  Final Endotracheal Airway*:  ETT  ETT Size (mm)*:  7.5  Cuff*:  Regular  Technique Used for Successful ETT Placement:  Video laryngoscopy  Devices/Methods Used in Placement*:  Mask  Intubation Procedure*:  Preoxygenation, ETCO2, Atraumatic, Dentition Unchanged, Pharynx Clear, Rapid Sequence Intubation and Cricoid Pressure  Insertion Site:  Oral  Blade Type*:  Glidescope  Blade Size*:  3  Cuff Volume (mL):  5  Secured at (cm)*:  22  Placement Verified by: auscultation, capnometry and equal breath sounds    Glottic View*:  2 - partial view of glottis  Attempts*:  1   Patient Identified, Procedure confirmed, Emergency equipment available and Safety protocols followed  Location:  OR  Urgency:  Elective  Difficult Airway: No    Indications for Airway Management:  Anesthesia  Sedation Level:  Anesthetized  Mask Difficulty Assessment:  0 - not attempted  Start Time: 9/13/2023 6:42 AM       Hand laceration

## 2023-10-02 NOTE — PATIENT PROFILE ADULT - PUBLIC BENEFITS
Ilumya Counseling: I discussed with the patient the risks of tildrakizumab including but not limited to immunosuppression, malignancy, posterior leukoencephalopathy syndrome, and serious infections.  The patient understands that monitoring is required including a PPD at baseline and must alert us or the primary physician if symptoms of infection or other concerning signs are noted. no

## 2023-11-30 ENCOUNTER — EMERGENCY (EMERGENCY)
Facility: HOSPITAL | Age: 38
LOS: 1 days | End: 2023-11-30
Attending: EMERGENCY MEDICINE
Payer: MEDICAID

## 2023-11-30 PROCEDURE — 99291 CRITICAL CARE FIRST HOUR: CPT

## 2023-11-30 PROCEDURE — 99285 EMERGENCY DEPT VISIT HI MDM: CPT | Mod: 25

## 2023-11-30 PROCEDURE — 92950 HEART/LUNG RESUSCITATION CPR: CPT
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
## 2023-11-30 NOTE — ED PROVIDER NOTE - NSAUTOPSYCONTME_GEN_ALL_RD
[facility] Medical Examiners Office [facility] Medical Examiners Office [facility] Medical Examiners Office

## 2024-02-20 NOTE — ED ADULT NURSE NOTE - NSICDXPASTSURGICALHX_GEN_ALL_CORE_FT
Urology referral     Losartan hydrochlorothiazide 100/25 mg   1 tab daily     Lab in 2 weeks     Blood pressure recheck in 4 weeks    PAST SURGICAL HISTORY:  No significant past surgical history

## 2024-12-18 NOTE — ED BEHAVIORAL HEALTH NOTE - BEHAVIORAL HEALTH NOTE
PROGRESS NOTE: 06-08-20 @ 12:46  	  • Reason for Ongoing Consultation: 	    ID: [id number] Male with HEALTH ISSUES - PROBLEM Dx:  Current moderate episode of major depressive disorder without prior episode          INTERVAL DATA:   • Interval Chief Complaint: "I had been thinking of jumping in traffic of hanging from a bridge"  • Interval History:   Pt endorsing SI with plans above, today which is on and off.  Pt became  agitated when talking about his mother and sister.  Pt recalls mothers treatment of him when he stated she was going to hurt himself he said to go ahead and do it.  Pt endorses alcohol use as a coping skill.  Pt is awaiting in pt psych tx for safety and mood stabilization, pending acceptance.  REVIEW OF SYSTEMS:   • Constitutional Symptoms	No complaints  • Eyes	No complaints  • Ears / Nose / Throat / Mouth	No complaints  • Cardiovascular	No complaints  • Respiratory	No complaints  • Gastrointestinal	No complaints  • Genitourinary	No complaints  • Musculoskeletal	No complaints  • Skin	No complaints  • Neurological	No complaints  • Psychiatric (see HPI)	See HPI  • Endocrine	No complaints  • Hematologic / Lymphatic	No complaints  • Allergic / Immunologic	No complaints    REVIEW OF VITALS/LABS/IMAGING/INVESTIGATIONS:   • Vital signs reviewed: Yes  • Vital Signs:	    T(C): 36.7 (06-08-20 @ 11:18), Max: 36.7 (06-07-20 @ 15:40)  HR: 79 (06-08-20 @ 11:18) (71 - 92)  BP: 106/73 (06-08-20 @ 11:18) (100/67 - 132/91)  RR: 18 (06-08-20 @ 11:18) (18 - 18)  SpO2: 98% (06-08-20 @ 11:18) (98% - 99%)    • Available labs reviewed: Yes  • Available Lab Results:                       MEDICATIONS:      PRN Medications:Ativan 2 mg  • PRN Medications since last evaluation	  • PRN Details	    Current Medications:   LORazepam     Tablet 2 milliGRAM(s) Oral every 6 hours PRN  nicotine - 21 mG/24Hr(s) Patch 1 patch Transdermal daily  QUEtiapine 50 milliGRAM(s) Oral at bedtime     Medication Side Effects:  • Medication Side Effects or Adverse Reactions (new or ongoing)	None known    MENTAL STATUS EXAM:   • Level of Consciousness	Alert  • General Appearance	Well developed  • Body Habitus	Well nourished  • Hygiene	fair  • Grooming	fair  • Behavior	Cooperative  • Eye Contact	Good  • Relatedness	Good  • Impulse Control	Normal  • Muscle Tone / Strength	Normal muscle tone/strength  • Abnormal Movements	No abnormal movements  • Gait / Station	abnormal  • Speech Volume	Normal  • Speech Rate	Normal  • Speech Spontaneity	Normal  • Speech Articulation	Normal  • Mood	depressed  • Affect Quality	depressed/anxious  • Affect Range	Full  • Affect Congruence	Congruent  • Thought Process	Linear  • Thought Associations	Normal  • Thought Content	Unremarkable  • Perceptions	No abnormalities  • Oriented to Time	Yes  • Oriented to Place	Yes  • Oriented to Situation	Yes  • Oriented to Person	Yes  • Attention / Concentration	Normal  • Estimated Intelligence	Average  • Recent Memory	Normal  • Remote Memory	Normal  • Fund of Knowledge	Normal  • Language	No abnormalities noted  • Judgment (regarding everyday events)	Fair  • Insight (regarding psychiatric illness)	Fair    SUICIDALITY:   • Suicidality (Interval)	endorsing SI with plans    HOMICIDALITY/AGGRESSION:   • Homicidality/Aggression	none known    DIAGNOSIS DSM-V:    Psychiatric Diagnosis (Corresponds to DSM-IV Axis I, II):   HEALTH ISSUES - PROBLEM Dx:  Current moderate episode of major depressive disorder without prior episode           Medical Diagnosis (Corresponds to DSM-IV Axis III):  • Axis III	      ASSESSMENT OF CURRENT CONDITION:   Summary (include case differential, formulation and patient response to therapy):   Pt remains angry and depressed with SI and plan to hurt himself.  Pt will require in pt psych admissions when bed becomes available.  Risk Assessment (consider static vs modifiable risk factors and protective factors; comment on level of risk for dangerous behavior):     PLAN  Admit to in pt psych Pt daughter Tatiana casper Tatiana [phone]

## 2025-01-21 NOTE — ED PROVIDER NOTE - NSBENEFITOFTRANSFER_ED_A_ED
[facility] provides services at a reduced cost to those who are determined to be eligible through [facility]’s financial assistance program. Information regarding [facility]’s financial assistance program can be found by going to [email or web address] or by calling [phone].
Obtain Level of Care/Service Not Available at this Facility

## 2025-05-16 NOTE — ED PROVIDER NOTE - CLINICAL SUMMARY MEDICAL DECISION MAKING FREE TEXT BOX
If you are a smoker, it is important for your health to stop smoking. Please be aware that second hand smoke is also harmful.
xray of ankle/foot reassess

## 2025-06-02 NOTE — ED ADULT NURSE NOTE - MODE OF DISCHARGE
Referral Type: Internal  Location: [facility]  Procedure: Colonoscopy  Referral Diagnosis: Screening  Referring Provider: TASHA Arcos  Referral To: Dr. Jerry Whitaker     Previous Procedure: EGD NO    2nd Previous Procedure: Colonoscopy NO     Ambulatory

## 2025-06-04 NOTE — ED ADULT NURSE NOTE - PAIN: PRESENCE, MLM
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   He has a history of sinus bradycardia, first degree AVB, and paroxysmal AF and an ILR was implanted in 2020. He underwent AF ablation on 4/9/21 (PVI, PWI, and CTI ablation) at [facility] with Dr Richi Pike.  Following the ablation he did well for the first year, and then was found to have episodes of recurrent pAF on followup.  Subsequently on ILR he has had episodes of pAF lasting up to 3.5 hours with rates up to 130s bpm(previously up to 31 hours), with overall AT/AF burden 1.4%. He was taking Toprol 25mg qd and ASA 81mg qd (off anticoagulation sine LAAO procedure).   On 4/29/25 He underwent subsequent AF ablation (FARAPULSE pulsed field ablation of atrial fibrillation with re-isolation of PVI, SVC isolation). Post procedure he had mild bleeding at the incision site which resolved with manual compression.  Today he is doing well overall following his ablation and denies palpitation, dizziness, SOB, syncope or near syncope. He continues to wear smart watch as well for alerts, which he has not had received. He has continued Xarelto 20mg before the procedure and will continue for two months post procedure.   Interrogation of ILR reveals no new AF recorded events since 4/21/25, since ablation there has been no AF events. PVC burden in 0%.   B/L groin sites are healed, well approximated without erythema, edema, drainage, exudate or signs of infection.   He has done well following LAAO.  A followup JAYLON 3/18/24 revealed a well seated Amulet with a small 2mm leak noted in the high angle view, and no DRT.  Subsequent JAYLON 12/16/24 revealed LVEF 60-65%, mild MR, trace TR, device positioned normally, small 2mm leak noted in the high angle view.   ECG today reveals SR HR 66 bpm   Current meds include ASA 81, Toprol 25, losartan 100, Crestor, metformin